# Patient Record
Sex: MALE | Race: BLACK OR AFRICAN AMERICAN | NOT HISPANIC OR LATINO | URBAN - METROPOLITAN AREA
[De-identification: names, ages, dates, MRNs, and addresses within clinical notes are randomized per-mention and may not be internally consistent; named-entity substitution may affect disease eponyms.]

---

## 2022-12-28 ENCOUNTER — OFFICE VISIT (OUTPATIENT)
Dept: INTERNAL MEDICINE | Facility: CLINIC | Age: 22
End: 2022-12-28

## 2022-12-28 VITALS
WEIGHT: 196.4 LBS | HEART RATE: 87 BPM | BODY MASS INDEX: 29.77 KG/M2 | HEIGHT: 68 IN | OXYGEN SATURATION: 98 % | DIASTOLIC BLOOD PRESSURE: 82 MMHG | SYSTOLIC BLOOD PRESSURE: 140 MMHG | TEMPERATURE: 98 F

## 2022-12-28 DIAGNOSIS — R61 HYPERHIDROSIS: Primary | ICD-10-CM

## 2022-12-28 DIAGNOSIS — K58.2 IRRITABLE BOWEL SYNDROME WITH BOTH CONSTIPATION AND DIARRHEA: ICD-10-CM

## 2022-12-28 DIAGNOSIS — G47.33 OSA (OBSTRUCTIVE SLEEP APNEA): ICD-10-CM

## 2022-12-28 PROCEDURE — 99203 OFFICE O/P NEW LOW 30 MIN: CPT | Performed by: NURSE PRACTITIONER

## 2022-12-28 RX ORDER — DICYCLOMINE HYDROCHLORIDE 10 MG/1
10 CAPSULE ORAL
Qty: 120 CAPSULE | Refills: 3 | Status: SHIPPED | OUTPATIENT
Start: 2022-12-28

## 2022-12-28 RX ORDER — GLYCOPYRROLATE 1 MG/1
1 TABLET ORAL 2 TIMES DAILY
Qty: 60 TABLET | Refills: 3 | Status: SHIPPED | OUTPATIENT
Start: 2022-12-28

## 2022-12-28 NOTE — PROGRESS NOTES
Subjective   Christiano Rivas is a 22 y.o. male. Patient is here today for   Chief Complaint   Patient presents with   • Establish Care   • Excessive Sweating          Vitals:    12/28/22 1517   BP: 140/82   Pulse: 87   Temp: 98 °F (36.7 °C)   SpO2: 98%     Body mass index is 29.86 kg/m².  The following portions of the patient's history were reviewed and updated as appropriate: allergies, current medications, past family history, past medical history, past social history, past surgical history and problem list.    Past Medical History:   Diagnosis Date   • Asthma    • Sleep apnea       No Known Allergies   Social History     Socioeconomic History   • Marital status: Single   Tobacco Use   • Smoking status: Never   Vaping Use   • Vaping Use: Never used   Substance and Sexual Activity   • Alcohol use: Never   • Drug use: Never        Current Outpatient Medications:   •  dicyclomine (BENTYL) 10 MG capsule, Take 1 capsule by mouth 4 (Four) Times a Day Before Meals & at Bedtime As Needed (stomach cramping)., Disp: 120 capsule, Rfl: 3  •  glycopyrrolate (Robinul) 1 MG tablet, Take 1 tablet by mouth 2 (Two) Times a Day., Disp: 60 tablet, Rfl: 3     Objective     History of Present Illness  Milind is a 22 year old male patient who is here to establish care. He was referred to our practice by his mom, who is a patient here. It has been several years since he has seen a PCP. He had BRITTANI as a child and had a Tonsillectomy and adenoidectomy. He was on CPAP for a short period. 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 He sometimes gets only a few hours of sleep. He has fatigue and snores. There has been no witnessed apnea  He has IBS with constipation and diarrhea. He gets pain and has to have a bowel movement right after he eats   He also has excessive sweating that has been occurring for years. It is axillary and palmar  He has used topicals which caused his hands  to break out in a rash and crack   He is here with his mom today     Review of Systems   Constitutional: Positive for fatigue.   Respiratory: Negative.    Cardiovascular: Negative.    Gastrointestinal: Positive for abdominal pain (cramping after eating, h/o ibs ), constipation and diarrhea.   Psychiatric/Behavioral: Positive for sleep disturbance. The patient is nervous/anxious (occasionally ).        Physical Exam  Vitals and nursing note reviewed.   Constitutional:       Appearance: Normal appearance.   Cardiovascular:      Rate and Rhythm: Normal rate and regular rhythm.      Heart sounds: Normal heart sounds.   Pulmonary:      Effort: Pulmonary effort is normal.      Breath sounds: Normal breath sounds.   Abdominal:      General: Bowel sounds are normal.   Skin:     General: Skin is moist.      Comments: Diaphoresis of palms, axilla noted    Neurological:      Mental Status: He is alert and oriented to person, place, and time.   Psychiatric:         Mood and Affect: Mood normal.         ASSESSMENT     Problems Addressed this Visit    None  Visit Diagnoses     Hyperhidrosis    -  Primary    Relevant Orders    CBC & Differential    Comprehensive Metabolic Panel    TSH Rfx On Abnormal To Free T4    BRITTANI (obstructive sleep apnea)        Relevant Orders    Ambulatory Referral to Sleep Medicine    Irritable bowel syndrome with both constipation and diarrhea          Diagnoses       Codes Comments    Hyperhidrosis    -  Primary ICD-10-CM: R61  ICD-9-CM: 705.21     BRITTANI (obstructive sleep apnea)     ICD-10-CM: G47.33  ICD-9-CM: 327.23     Irritable bowel syndrome with both constipation and diarrhea     ICD-10-CM: K58.2  ICD-9-CM: 564.1           PLAN  1. Hyperhydrosis, trial of robinul , he can take it 1-2 times daily. Discussed potential side effects and he will let me know if he cannot tolerate it. Can refer to dermatology if no improvement   2. BRITTANI dx in 2014 - has not been on CPAP, will refer to sleep medicine for  repeat sleep study   3. IBS - declined GI referral, recommend IB guard otc three times daily, rx for bentyl given to take as needed   Will check labs today and call with results     Return in about 6 months (around 6/28/2023) for Annual physical.

## 2022-12-29 LAB
ALBUMIN SERPL-MCNC: 4.7 G/DL (ref 3.5–5.2)
ALBUMIN/GLOB SERPL: 2 G/DL
ALP SERPL-CCNC: 88 U/L (ref 39–117)
ALT SERPL-CCNC: 31 U/L (ref 1–41)
AST SERPL-CCNC: 20 U/L (ref 1–40)
BASOPHILS # BLD AUTO: 0.04 10*3/MM3 (ref 0–0.2)
BASOPHILS NFR BLD AUTO: 0.4 % (ref 0–1.5)
BILIRUB SERPL-MCNC: 0.5 MG/DL (ref 0–1.2)
BUN SERPL-MCNC: 17 MG/DL (ref 6–20)
BUN/CREAT SERPL: 14.4 (ref 7–25)
CALCIUM SERPL-MCNC: 10 MG/DL (ref 8.6–10.5)
CHLORIDE SERPL-SCNC: 103 MMOL/L (ref 98–107)
CO2 SERPL-SCNC: 30 MMOL/L (ref 22–29)
CREAT SERPL-MCNC: 1.18 MG/DL (ref 0.76–1.27)
EGFRCR SERPLBLD CKD-EPI 2021: 89.5 ML/MIN/1.73
EOSINOPHIL # BLD AUTO: 0.43 10*3/MM3 (ref 0–0.4)
EOSINOPHIL NFR BLD AUTO: 4 % (ref 0.3–6.2)
ERYTHROCYTE [DISTWIDTH] IN BLOOD BY AUTOMATED COUNT: 13.3 % (ref 12.3–15.4)
GLOBULIN SER CALC-MCNC: 2.3 GM/DL
GLUCOSE SERPL-MCNC: 97 MG/DL (ref 65–99)
HCT VFR BLD AUTO: 47.9 % (ref 37.5–51)
HGB BLD-MCNC: 15.6 G/DL (ref 13–17.7)
IMM GRANULOCYTES # BLD AUTO: 0.03 10*3/MM3 (ref 0–0.05)
IMM GRANULOCYTES NFR BLD AUTO: 0.3 % (ref 0–0.5)
LYMPHOCYTES # BLD AUTO: 4.87 10*3/MM3 (ref 0.7–3.1)
LYMPHOCYTES NFR BLD AUTO: 45.6 % (ref 19.6–45.3)
MCH RBC QN AUTO: 27.1 PG (ref 26.6–33)
MCHC RBC AUTO-ENTMCNC: 32.6 G/DL (ref 31.5–35.7)
MCV RBC AUTO: 83.2 FL (ref 79–97)
MONOCYTES # BLD AUTO: 0.86 10*3/MM3 (ref 0.1–0.9)
MONOCYTES NFR BLD AUTO: 8.1 % (ref 5–12)
NEUTROPHILS # BLD AUTO: 4.44 10*3/MM3 (ref 1.7–7)
NEUTROPHILS NFR BLD AUTO: 41.6 % (ref 42.7–76)
NRBC BLD AUTO-RTO: 0 /100 WBC (ref 0–0.2)
PLATELET # BLD AUTO: 278 10*3/MM3 (ref 140–450)
POTASSIUM SERPL-SCNC: 4.5 MMOL/L (ref 3.5–5.2)
PROT SERPL-MCNC: 7 G/DL (ref 6–8.5)
RBC # BLD AUTO: 5.76 10*6/MM3 (ref 4.14–5.8)
SODIUM SERPL-SCNC: 143 MMOL/L (ref 136–145)
TSH SERPL DL<=0.005 MIU/L-ACNC: 1.05 UIU/ML (ref 0.27–4.2)
WBC # BLD AUTO: 10.67 10*3/MM3 (ref 3.4–10.8)

## 2023-04-26 ENCOUNTER — OFFICE VISIT (OUTPATIENT)
Dept: SLEEP MEDICINE | Facility: HOSPITAL | Age: 23
End: 2023-04-26
Payer: OTHER GOVERNMENT

## 2023-04-26 VITALS
DIASTOLIC BLOOD PRESSURE: 77 MMHG | OXYGEN SATURATION: 100 % | HEIGHT: 68 IN | WEIGHT: 209 LBS | HEART RATE: 66 BPM | BODY MASS INDEX: 31.67 KG/M2 | SYSTOLIC BLOOD PRESSURE: 142 MMHG

## 2023-04-26 DIAGNOSIS — G47.33 OSA (OBSTRUCTIVE SLEEP APNEA): Primary | ICD-10-CM

## 2023-04-26 DIAGNOSIS — G47.26 CIRCADIAN RHYTHM SLEEP DISORDER, SHIFT WORK TYPE: ICD-10-CM

## 2023-04-26 PROCEDURE — G0463 HOSPITAL OUTPT CLINIC VISIT: HCPCS

## 2023-04-26 NOTE — PROGRESS NOTES
"[facility]  Telephone: [phone] / Fax: [phone] [place]  Telephone: [phone] / Fax: [phone] [place]    Referring Physician: Jyoti Aragon APRN  PCP: Jyoti Aragon APRN    Reason for consult:  sleep apnea    Akua Rivas is a 22 y.o.male  was seen in the Sleep Disorders Center today for evaluation of sleep apnea. Pt is here today for evaluation of very disturbing snoring. Snoring is associated with gasping for breath and apneas. 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 Shift starts at 8:30pm and ends at 5am.  He goes to bed at 7:30am. However he does not get 7-8 hour of sleep.  He feels tired/sleepy during the day. He continues to report sleepiness even if he sleeps longer. He reports difficulty concentrating, poor memory, anxiety and depression.    SH- no tobacco, 1 caffeine    ROS-+nasal congestion, +myalgias, +chest pain, +dizziness, +anxiety, +GERD.      Akua Rivas  has a past medical history of Asthma and Sleep apnea.    Current Medications:    Current Outpatient Medications:   •  dicyclomine (BENTYL) 10 MG capsule, Take 1 capsule by mouth 4 (Four) Times a Day Before Meals & at Bedtime As Needed (stomach cramping)., Disp: 120 capsule, Rfl: 3  •  glycopyrrolate (Robinul) 1 MG tablet, Take 1 tablet by mouth 2 (Two) Times a Day., Disp: 60 tablet, Rfl: 3    I have reviewed Past Medical History, Past Surgical History, Medication List, Social History and Family History as entered in Sleep Questionnaire and EPIC.    ESS  6   Vital Signs /77   Pulse 66   Ht 172.7 cm (68\")   Wt 94.8 kg (209 lb)   SpO2 100%   BMI 31.78 kg/m²  Body mass index is 31.78 kg/m².    General Alert and oriented. No acute distress noted   Pharynx/Throat Class IV  Mallampati airway, large tongue, no evidence of redundant lateral pharyngeal tissue. No oral lesions. No thrush. Moist mucous membranes.   Head Normocephalic. Symmetrical. Atraumatic.    Nose No " septal deviation. No drainage   Chest Wall Normal shape. Symmetric expansion with respiration. No tenderness.   Neck Trachea midline, no thyromegaly or adenopathy    Lungs Clear to auscultation bilaterally. No wheezes. No rhonchi. No rales. Respirations regular, even and unlabored.   Heart Regular rhythm and normal rate. Normal S1 and S2. No murmur   Abdomen Soft, non-tender and non-distended. Normal bowel sounds. No masses.   Extremities Moves all extremities well. No edema   Psychiatric Normal mood and affect.        Impression:  1. BRITTANI (obstructive sleep apnea)    2. Circadian rhythm sleep disorder, shift work type          Plan:  I discussed the pathophysiology of obstructive sleep apnea with the patient.  We discussed the adverse outcomes associated with untreated sleep-disordered breathing.   Sleep study will be scheduled to establish a definitive diagnosis of sleep disorder breathing.  Weight loss will be strongly beneficial in order to reduce the severity of sleep-disordered breathing.  Patient has narrow oropharyngeal structure.  Caution during activities that require prolonged concentration is strongly advised.  After sleep study results are available, patient will be notified, and appointment will be scheduled to discuss sleep study results and treatment recommendations.    HST was ordered.    I appreciate the opportunity to participate in this patient's care.      PIPPA Ohara  [facility]  Phone: [phone]      Part of this note may be an electronic transcription/translation of spoken language to printed text using the Dragon Dictation System. Some errors may exist even though the document was edited.

## 2023-06-15 DIAGNOSIS — Z11.59 NEED FOR HEPATITIS C SCREENING TEST: ICD-10-CM

## 2023-06-15 DIAGNOSIS — Z00.00 ROUTINE HEALTH MAINTENANCE: Primary | ICD-10-CM

## 2023-06-21 LAB — CK SERPL-CCNC: 6004 U/L (ref 20–200)

## 2023-06-22 LAB
ALBUMIN SERPL-MCNC: 4.6 G/DL (ref 3.5–5.2)
ALBUMIN/GLOB SERPL: 1.9 G/DL
ALP SERPL-CCNC: 87 U/L (ref 39–117)
ALT SERPL-CCNC: 284 U/L (ref 1–41)
APPEARANCE UR: CLEAR
AST SERPL-CCNC: 174 U/L (ref 1–40)
BACTERIA #/AREA URNS HPF: NORMAL /HPF
BASOPHILS # BLD AUTO: 0.03 10*3/MM3 (ref 0–0.2)
BASOPHILS NFR BLD AUTO: 0.3 % (ref 0–1.5)
BILIRUB SERPL-MCNC: 0.4 MG/DL (ref 0–1.2)
BILIRUB UR QL STRIP: NEGATIVE
BUN SERPL-MCNC: 14 MG/DL (ref 6–20)
BUN/CREAT SERPL: 12.6 (ref 7–25)
CALCIUM SERPL-MCNC: 10.5 MG/DL (ref 8.6–10.5)
CASTS URNS MICRO: NORMAL
CHLORIDE SERPL-SCNC: 100 MMOL/L (ref 98–107)
CHOLEST SERPL-MCNC: 168 MG/DL (ref 0–200)
CO2 SERPL-SCNC: 28.1 MMOL/L (ref 22–29)
COLOR UR: YELLOW
CREAT SERPL-MCNC: 1.11 MG/DL (ref 0.76–1.27)
EGFRCR SERPLBLD CKD-EPI 2021: 96.3 ML/MIN/1.73
EOSINOPHIL # BLD AUTO: 0.66 10*3/MM3 (ref 0–0.4)
EOSINOPHIL NFR BLD AUTO: 6.7 % (ref 0.3–6.2)
EPI CELLS #/AREA URNS HPF: NORMAL /HPF
ERYTHROCYTE [DISTWIDTH] IN BLOOD BY AUTOMATED COUNT: 13.7 % (ref 12.3–15.4)
GLOBULIN SER CALC-MCNC: 2.4 GM/DL
GLUCOSE SERPL-MCNC: 100 MG/DL (ref 65–99)
GLUCOSE UR QL STRIP: NEGATIVE
HCT VFR BLD AUTO: 50 % (ref 37.5–51)
HCV IGG SERPL QL IA: NON REACTIVE
HDLC SERPL-MCNC: 41 MG/DL (ref 40–60)
HGB BLD-MCNC: 16.2 G/DL (ref 13–17.7)
HGB UR QL STRIP: NEGATIVE
IMM GRANULOCYTES # BLD AUTO: 0.03 10*3/MM3 (ref 0–0.05)
IMM GRANULOCYTES NFR BLD AUTO: 0.3 % (ref 0–0.5)
KETONES UR QL STRIP: NEGATIVE
LDLC SERPL CALC-MCNC: 114 MG/DL (ref 0–100)
LDLC/HDLC SERPL: 2.77 {RATIO}
LEUKOCYTE ESTERASE UR QL STRIP: NEGATIVE
LYMPHOCYTES # BLD AUTO: 4.4 10*3/MM3 (ref 0.7–3.1)
LYMPHOCYTES NFR BLD AUTO: 44.7 % (ref 19.6–45.3)
MCH RBC QN AUTO: 27 PG (ref 26.6–33)
MCHC RBC AUTO-ENTMCNC: 32.4 G/DL (ref 31.5–35.7)
MCV RBC AUTO: 83.3 FL (ref 79–97)
MONOCYTES # BLD AUTO: 0.73 10*3/MM3 (ref 0.1–0.9)
MONOCYTES NFR BLD AUTO: 7.4 % (ref 5–12)
NEUTROPHILS # BLD AUTO: 3.99 10*3/MM3 (ref 1.7–7)
NEUTROPHILS NFR BLD AUTO: 40.6 % (ref 42.7–76)
NITRITE UR QL STRIP: NEGATIVE
NRBC BLD AUTO-RTO: 0 /100 WBC (ref 0–0.2)
PH UR STRIP: 6 [PH] (ref 5–8)
PLATELET # BLD AUTO: 280 10*3/MM3 (ref 140–450)
POTASSIUM SERPL-SCNC: 4.9 MMOL/L (ref 3.5–5.2)
PROT SERPL-MCNC: 7 G/DL (ref 6–8.5)
PROT UR QL STRIP: NEGATIVE
RBC # BLD AUTO: 6 10*6/MM3 (ref 4.14–5.8)
RBC #/AREA URNS HPF: NORMAL /HPF
SODIUM SERPL-SCNC: 138 MMOL/L (ref 136–145)
SP GR UR STRIP: 1.02 (ref 1–1.03)
TRIGL SERPL-MCNC: 68 MG/DL (ref 0–150)
TSH SERPL DL<=0.005 MIU/L-ACNC: 0.59 UIU/ML (ref 0.27–4.2)
UROBILINOGEN UR STRIP-MCNC: NORMAL MG/DL
VLDLC SERPL CALC-MCNC: 13 MG/DL (ref 5–40)
WBC # BLD AUTO: 9.84 10*3/MM3 (ref 3.4–10.8)
WBC #/AREA URNS HPF: NORMAL /HPF

## 2023-08-14 ENCOUNTER — TELEPHONE (OUTPATIENT)
Dept: INTERNAL MEDICINE | Facility: CLINIC | Age: 23
End: 2023-08-14
Payer: OTHER GOVERNMENT

## 2023-08-14 ENCOUNTER — OFFICE VISIT (OUTPATIENT)
Dept: INTERNAL MEDICINE | Facility: CLINIC | Age: 23
End: 2023-08-14
Payer: OTHER GOVERNMENT

## 2023-08-14 VITALS
BODY MASS INDEX: 32.13 KG/M2 | RESPIRATION RATE: 18 BRPM | OXYGEN SATURATION: 97 % | HEIGHT: 68 IN | SYSTOLIC BLOOD PRESSURE: 118 MMHG | WEIGHT: 212 LBS | DIASTOLIC BLOOD PRESSURE: 80 MMHG | HEART RATE: 71 BPM

## 2023-08-14 DIAGNOSIS — S61.210A LACERATION OF RIGHT INDEX FINGER WITHOUT FOREIGN BODY, NAIL DAMAGE STATUS UNSPECIFIED, INITIAL ENCOUNTER: Primary | ICD-10-CM

## 2023-08-14 PROCEDURE — 99213 OFFICE O/P EST LOW 20 MIN: CPT | Performed by: NURSE PRACTITIONER

## 2023-08-14 NOTE — TELEPHONE ENCOUNTER
SPOKE WITH PATIENT, [facility] THAT HE WENT TO WON'T GIVE HIM NOTE, THEY ADVISED TO CONTACT PCP.  D[facility] HAD NO OPENINGS BUT DR KAUR HAD SAME DAY.  SCHEDULED TO COME IN AT 12:45.

## 2023-08-14 NOTE — TELEPHONE ENCOUNTER
Caller: Akua Rivas    Relationship: Self    Best call back number: [phone]     What is the best time to reach you: ANY    Who are you requesting to speak with (clinical staff, provider,  specific staff member): CLINICAL STAFF    Do you know the name of the person who called:      What was the call regarding: PATIENT CALLED HE WAS WASHING DISHES CUT HIS RIGHT INDEX FINGER AROUND THE KNUCKLE AREA AND WENT TO [facility] 8/13/23 SUNDAY.  HE HAS 3 STITCHES WITH SPLINT AND WANTS TO KNOW IF HE NEEDS TO BE ON LIGHT DUTY AT WORK.  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  WILL NEED NOTE IF HE IS SUPPOSE ON LIGHT DUTY.      Is it okay if the provider responds through [facility]: YES

## 2023-08-14 NOTE — PROGRESS NOTES
Subjective   Akua Rivas is a 22 y.o. male.   Chief Complaint   Patient presents with    Cut on Finger    Laceration     Follow up urgent care      Vitals:    08/14/23 1244   BP: 118/80   Pulse: 71   Resp: 18   SpO2: 97%     No LMP for male patient.    History of Present Illness  Akua is a 22 year old male patient who is here for a follow up from Urgent care . He was cut his right index finger with a kitchen knife. He was seen at [facility] urgent care yesterday and had 3 sutures placed. Tdap was also updated.     The following portions of the patient's history were reviewed and updated as appropriate: allergies, current medications, past family history, past medical history, past social history, past surgical history, and problem list.    Review of Systems   Constitutional:  Negative for fever.   Skin:         Laceration      Objective   Physical Exam  Vitals and nursing note reviewed.   Pulmonary:      Effort: Pulmonary effort is normal.   Skin:     Findings: Laceration (to right index finger. dressing removed, sutures are intact, no drainage or swelling noted) present.   Neurological:      Mental Status: He is alert.       Assessment & Plan   Diagnoses and all orders for this visit:    1. Laceration of right index finger without foreign body, nail damage status unspecified, initial encounter (Primary)      Suture removal per [facility] recommendations. He can schedule an appt to return here for removal or can have them removed where they were placed  Keep the area clean and dry  Keep covered when at work.   Work note given for tonight, can return to work Thursday. If he needs more time off work or more accommodations he will let me know.

## 2024-07-03 DIAGNOSIS — Z00.00 ROUTINE HEALTH MAINTENANCE: Primary | ICD-10-CM

## 2024-07-13 LAB
ALBUMIN SERPL-MCNC: 4.7 G/DL (ref 3.5–5.2)
ALBUMIN/GLOB SERPL: 2.1 G/DL
ALP SERPL-CCNC: 101 U/L (ref 39–117)
ALT SERPL-CCNC: 45 U/L (ref 1–41)
AST SERPL-CCNC: 22 U/L (ref 1–40)
BASOPHILS # BLD MANUAL: 0 10*3/MM3 (ref 0–0.2)
BASOPHILS NFR BLD MANUAL: 0 % (ref 0–1.5)
BILIRUB SERPL-MCNC: 0.6 MG/DL (ref 0–1.2)
BUN SERPL-MCNC: 12 MG/DL (ref 6–20)
BUN/CREAT SERPL: 10.9 (ref 7–25)
CALCIUM SERPL-MCNC: 9.6 MG/DL (ref 8.6–10.5)
CHLORIDE SERPL-SCNC: 104 MMOL/L (ref 98–107)
CHOLEST SERPL-MCNC: 176 MG/DL (ref 0–200)
CO2 SERPL-SCNC: 25.8 MMOL/L (ref 22–29)
CREAT SERPL-MCNC: 1.1 MG/DL (ref 0.76–1.27)
DIFFERENTIAL COMMENT: ABNORMAL
EGFRCR SERPLBLD CKD-EPI 2021: 96.7 ML/MIN/1.73
EOSINOPHIL # BLD MANUAL: 0 10*3/MM3 (ref 0–0.4)
EOSINOPHIL NFR BLD MANUAL: 0 % (ref 0.3–6.2)
ERYTHROCYTE [DISTWIDTH] IN BLOOD BY AUTOMATED COUNT: 13.5 % (ref 12.3–15.4)
GLOBULIN SER CALC-MCNC: 2.2 GM/DL
GLUCOSE SERPL-MCNC: 109 MG/DL (ref 65–99)
HCT VFR BLD AUTO: 48.2 % (ref 37.5–51)
HDLC SERPL-MCNC: 39 MG/DL (ref 40–60)
HGB BLD-MCNC: 15.5 G/DL (ref 13–17.7)
LDLC SERPL CALC-MCNC: 120 MG/DL (ref 0–100)
LDLC/HDLC SERPL: 3.04 {RATIO}
LYMPHOCYTES # BLD MANUAL: 0 10*3/MM3 (ref 0.7–3.1)
LYMPHOCYTES NFR BLD MANUAL: 0 % (ref 19.6–45.3)
MCH RBC QN AUTO: 27.1 PG (ref 26.6–33)
MCHC RBC AUTO-ENTMCNC: 32.2 G/DL (ref 31.5–35.7)
MCV RBC AUTO: 84.4 FL (ref 79–97)
MONOCYTES # BLD MANUAL: 0 10*3/MM3 (ref 0.1–0.9)
MONOCYTES NFR BLD MANUAL: 0 % (ref 5–12)
NEUTROPHILS # BLD MANUAL: 0 10*3/MM3 (ref 1.7–7)
NEUTROPHILS NFR BLD MANUAL: 0 % (ref 42.7–76)
PLATELET # BLD AUTO: 304 10*3/MM3 (ref 140–450)
PLATELET BLD QL SMEAR: ABNORMAL
POTASSIUM SERPL-SCNC: 4.4 MMOL/L (ref 3.5–5.2)
PROT SERPL-MCNC: 6.9 G/DL (ref 6–8.5)
RBC # BLD AUTO: 5.71 10*6/MM3 (ref 4.14–5.8)
RBC MORPH BLD: ABNORMAL
SODIUM SERPL-SCNC: 142 MMOL/L (ref 136–145)
TRIGL SERPL-MCNC: 92 MG/DL (ref 0–150)
TSH SERPL DL<=0.005 MIU/L-ACNC: 0.47 UIU/ML (ref 0.27–4.2)
VLDLC SERPL CALC-MCNC: 17 MG/DL (ref 5–40)
WBC # BLD AUTO: 11.39 10*3/MM3 (ref 3.4–10.8)

## 2024-07-15 ENCOUNTER — TELEPHONE (OUTPATIENT)
Dept: INTERNAL MEDICINE | Facility: CLINIC | Age: 24
End: 2024-07-15
Payer: OTHER GOVERNMENT

## 2024-07-15 NOTE — TELEPHONE ENCOUNTER
Ok for HUB to read and schedule:    Sent [employer] message for patient to call office and schedule an office visit with Jyoti Aragon to go over lab results

## 2024-07-15 NOTE — TELEPHONE ENCOUNTER
----- Message from Mike GARCIA sent at 7/15/2024 10:41 AM EDT -----  Pt had labs drawn but does not have a follow up scheduled. Please schedule patient appt  ----- Message -----  From: Jyoti Aragon APRN  Sent: 7/15/2024   8:34 AM EDT  To: Mike Tomas MA    Pt had labs drawn but does not have a follow up scheduled. Please schedule patient appt   EMT/paramedic

## 2024-07-16 ENCOUNTER — TELEPHONE (OUTPATIENT)
Dept: INTERNAL MEDICINE | Facility: CLINIC | Age: 24
End: 2024-07-16
Payer: OTHER GOVERNMENT

## 2024-07-16 NOTE — TELEPHONE ENCOUNTER
Reviewed labs,   Pt had physical labs and no appt to follow up. Please call and schedule pt a follow up appt

## 2024-07-22 ENCOUNTER — OFFICE VISIT (OUTPATIENT)
Dept: INTERNAL MEDICINE | Facility: CLINIC | Age: 24
End: 2024-07-22
Payer: OTHER GOVERNMENT

## 2024-07-22 VITALS
HEART RATE: 80 BPM | DIASTOLIC BLOOD PRESSURE: 80 MMHG | RESPIRATION RATE: 16 BRPM | HEIGHT: 68 IN | WEIGHT: 223 LBS | BODY MASS INDEX: 33.8 KG/M2 | OXYGEN SATURATION: 98 % | SYSTOLIC BLOOD PRESSURE: 144 MMHG

## 2024-07-22 DIAGNOSIS — Z00.00 ANNUAL PHYSICAL EXAM: Primary | ICD-10-CM

## 2024-07-22 DIAGNOSIS — Z91.09 ENVIRONMENTAL ALLERGIES: ICD-10-CM

## 2024-07-22 DIAGNOSIS — D72.829 LEUKOCYTOSIS, UNSPECIFIED TYPE: ICD-10-CM

## 2024-07-22 PROCEDURE — 99395 PREV VISIT EST AGE 18-39: CPT | Performed by: NURSE PRACTITIONER

## 2024-07-22 PROCEDURE — 99213 OFFICE O/P EST LOW 20 MIN: CPT | Performed by: NURSE PRACTITIONER

## 2024-07-22 RX ORDER — DICYCLOMINE HYDROCHLORIDE 10 MG/1
10 CAPSULE ORAL
Qty: 120 CAPSULE | Refills: 3 | Status: SHIPPED | OUTPATIENT
Start: 2024-07-22

## 2024-07-22 NOTE — PROGRESS NOTES
Subjective   Akua Rivas is a 23 y.o. male. Patient is here today for   Chief Complaint   Patient presents with    Annual Exam          Vitals:    07/22/24 1401   BP: 144/80   Pulse: 80   Resp: 16   SpO2: 98%     Body mass index is 33.91 kg/m².    The following portions of the patient's history were reviewed and updated as appropriate: allergies, current medications, past family history, past medical history, past social history, past surgical history and problem list.    Past Medical History:   Diagnosis Date    Asthma     Sleep apnea       No Known Allergies   Social History     Socioeconomic History    Marital status: Single   Tobacco Use    Smoking status: Never    Smokeless tobacco: Never   Vaping Use    Vaping status: Never Used   Substance and Sexual Activity    Alcohol use: Never    Drug use: Never        Current Outpatient Medications:     dicyclomine (BENTYL) 10 MG capsule, Take 1 capsule by mouth 4 (Four) Times a Day Before Meals & at Bedtime As Needed (stomach cramping)., Disp: 120 capsule, Rfl: 3       Objective   History of Present Illness   Akua Rivas 23 y.o. male who presents for an Annual physical exam.   Lab results discussed in detail with the patient.  Plan to update vaccines if needed today. He is here with his mother today .     Health Habits:  Dental Exam. not up to date - .  Eye Exam. up to date  Exercise: 0 times/week.  Current exercise activities include: none      Preventative counseling  Discussed face to face the importance of healthy diet and exercise.     Lab Results (most recent)       Orders Only on 07/03/2024   Component Date Value Ref Range Status    Total Cholesterol 07/12/2024 176  0 - 200 mg/dL Final    Comment: Cholesterol Reference Ranges  (U.S. Department of Health and Human Services ATP III  Classifications)  Desirable          <200 mg/dL  Borderline High    200-239 mg/dL  High Risk          >240 mg/dL  Triglyceride Reference Ranges  (U.S. Department of Health and Human  Services ATP III  Classifications)  Normal           <150 mg/dL  Borderline High  150-199 mg/dL  High             200-499 mg/dL  Very High        >500 mg/dL  HDL Reference Ranges  (U.S. Department of Health and Human Services ATP III  Classifications)  Low     <40 mg/dl (major risk factor for CHD)  High    >60 mg/dl ('negative' risk factor for CHD)  LDL Reference Ranges  (U.S. Department of Health and Human Services ATP III  Classifications)  Optimal          <100 mg/dL  Near Optimal     100-129 mg/dL  Borderline High  130-159 mg/dL  High             160-189 mg/dL  Very High        >189 mg/dL      Triglycerides 07/12/2024 92  0 - 150 mg/dL Final    HDL Cholesterol 07/12/2024 39 (L)  40 - 60 mg/dL Final    VLDL Cholesterol Fermin 07/12/2024 17  5 - 40 mg/dL Final    LDL Chol Calc (NIH) 07/12/2024 120 (H)  0 - 100 mg/dL Final    LDL/HDL RATIO 07/12/2024 3.04   Final    Glucose 07/12/2024 109 (H)  65 - 99 mg/dL Final    BUN 07/12/2024 12  6 - 20 mg/dL Final    Creatinine 07/12/2024 1.10  0.76 - 1.27 mg/dL Final    EGFR Result 07/12/2024 96.7  >60.0 mL/min/1.73 Final    Comment: GFR Normal >60  Chronic Kidney Disease <60  Kidney Failure <15      BUN/Creatinine Ratio 07/12/2024 10.9  7.0 - 25.0 Final    Sodium 07/12/2024 142  136 - 145 mmol/L Final    Potassium 07/12/2024 4.4  3.5 - 5.2 mmol/L Final    Chloride 07/12/2024 104  98 - 107 mmol/L Final    Total CO2 07/12/2024 25.8  22.0 - 29.0 mmol/L Final    Calcium 07/12/2024 9.6  8.6 - 10.5 mg/dL Final    Total Protein 07/12/2024 6.9  6.0 - 8.5 g/dL Final    Albumin 07/12/2024 4.7  3.5 - 5.2 g/dL Final    Globulin 07/12/2024 2.2  gm/dL Final    A/G Ratio 07/12/2024 2.1  g/dL Final    Total Bilirubin 07/12/2024 0.6  0.0 - 1.2 mg/dL Final    Alkaline Phosphatase 07/12/2024 101  39 - 117 U/L Final    AST (SGOT) 07/12/2024 22  1 - 40 U/L Final    ALT (SGPT) 07/12/2024 45 (H)  1 - 41 U/L Final    TSH 07/12/2024 0.468  0.270 - 4.200 uIU/mL Final    WBC 07/12/2024 11.39 (H)  3.40 -  10.80 10*3/mm3 Final    RBC 07/12/2024 5.71  4.14 - 5.80 10*6/mm3 Final    Hemoglobin 07/12/2024 15.5  13.0 - 17.7 g/dL Final    Hematocrit 07/12/2024 48.2  37.5 - 51.0 % Final    MCV 07/12/2024 84.4  79.0 - 97.0 fL Final    MCH 07/12/2024 27.1  26.6 - 33.0 pg Final    MCHC 07/12/2024 32.2  31.5 - 35.7 g/dL Final    RDW 07/12/2024 13.5  12.3 - 15.4 % Final    Platelets 07/12/2024 304  140 - 450 10*3/mm3 Final    Neutrophil Rel % 07/12/2024 0.0 (L)  42.7 - 76.0 % Corrected    Comment: Result removed.  Corrected result. Previous result was 20.0 % on 7/12/2024  at 1646 EDT.  The previously reported component Eosinophils % is no  longer being reported. Previous result was 10.5 %  (Reference Range: 0.3-6.2 %) on 7/12/2024 at 1646 EDT.  The previously reported component Absolute Eosinophils is  no longer being reported. Previous result was 1.20 10*3/mm3  (Reference Range: 0.00-0.40 10*3/mm3) on 7/12/2024 at 1646  EDT.      Lymphocyte Rel % 07/12/2024 0.0 (L)  19.6 - 45.3 % Corrected    Comment: Result removed.  Corrected result. Previous result was 65.3 % on 7/12/2024  at 1646 EDT.      Monocyte Rel % 07/12/2024 0.0 (L)  5.0 - 12.0 % Corrected    Comment: Result removed.  Corrected result. Previous result was 4.2 % on 7/12/2024 at  1646 EDT.      Eosinophil Rel % 07/12/2024 0.0 (L)  0.3 - 6.2 % Corrected    Result removed.    Basophil Rel % 07/12/2024 0.0  0.0 - 1.5 % Final    Neutrophils Absolute 07/12/2024 0.00 (L)  1.70 - 7.00 10*3/mm3 Corrected    Comment: Result removed.  Corrected result. Previous result was 2.28 10*3/mm3 on  7/12/2024 at 1646 EDT.      Lymphocytes Absolute 07/12/2024 0.00 (L)  0.70 - 3.10 10*3/mm3 Corrected    Comment: Result removed.  Corrected result. Previous result was 7.44 10*3/mm3 on  7/12/2024 at 1646 EDT.      Monocytes Absolute 07/12/2024 0.00 (L)  0.10 - 0.90 10*3/mm3 Corrected    Comment: Result removed.  Corrected result. Previous result was 0.48 10*3/mm3 on  7/12/2024 at 1646 EDT.       Eosinophil Abs 07/12/2024 0.00  0.00 - 0.40 10*3/mm3 Corrected    Result removed.    Basophils Absolute 07/12/2024 0.00  0.00 - 0.20 10*3/mm3 Final    Differential Comment 07/12/2024 Comment   Corrected    Comment: WBC MORPHOLOGY               Normal                      Normal     N  Result removed.  Appended report. These results have been appended to a  previously final verified report.  SMUDGE CELLS                 TNP                                    N  Corrected result. Previous result was Slight/1+ on  7/12/2024 at 1646 EDT.      Comment 07/12/2024 Comment   Corrected    Comment: RBC MORPHOLOGY               Normal                      Normal     N  Result removed.  Appended report. These results have been appended to a  previously final verified report.  ACANTHOCYTES                 TNP                                    N  Corrected result. Previous result was Slight/1+ on  7/12/2024 at 1646 EDT.  POIKILOCYTOSIS               TNP                                    N  Corrected result. Previous result was Mod/2+ on 7/12/2024  at 1646 EDT.  POLYCHROMASIA                TNP                                    N  Corrected result. Previous result was Slight/1+ on  7/12/2024 at 1646 EDT.  SCHISTOCYTES                 TNP                                    N  Corrected result. Previous result was Slight/1+ on  7/12/2024 at 1646 EDT.      Plt Comment 07/12/2024 Comment   Final    Comment: PLATELET MORPHOLOGY          TNP                                    N  Result removed.  Corrected result. Previous result was Normal on 7/12/2024  at 1646 EDT.                     Review of Systems   Constitutional:  Negative for fatigue.   Respiratory:  Negative for chest tightness.    Gastrointestinal:  Positive for constipation and diarrhea.        IBS    Genitourinary: Negative.    Musculoskeletal: Negative.    Allergic/Immunologic: Positive for environmental allergies and food allergies.   Neurological:  Positive for  dizziness.   Hematological:  Negative for adenopathy. Does not bruise/bleed easily.   Psychiatric/Behavioral:  Negative for dysphoric mood. The patient is nervous/anxious.        Physical Exam  Vitals and nursing note reviewed.   Constitutional:       General: He is not in acute distress.     Appearance: Normal appearance. He is well-developed and well-groomed.   HENT:      Head: Normocephalic.      Ears:      Comments: Cerumen in canal bilaterally, not obscuring TM      Nose: Nose normal.      Mouth/Throat:      Pharynx: Oropharynx is clear.   Eyes:      General: Lids are normal.      Conjunctiva/sclera: Conjunctivae normal.   Neck:      Thyroid: No thyromegaly.   Cardiovascular:      Rate and Rhythm: Normal rate and regular rhythm.      Heart sounds: Normal heart sounds.   Pulmonary:      Effort: Pulmonary effort is normal.   Abdominal:      General: Bowel sounds are normal.      Palpations: Abdomen is soft.   Musculoskeletal:      Cervical back: Neck supple.   Skin:     General: Skin is warm and dry.   Neurological:      Mental Status: He is alert and oriented to person, place, and time.   Psychiatric:         Mood and Affect: Mood normal.         ASSESSMENT       Problems Addressed this Visit    None  Visit Diagnoses       Annual physical exam    -  Primary    Relevant Orders    C-reactive protein    Leukocytosis, unspecified type        Relevant Orders    Peripheral Blood Smear    CBC & Differential    Sedimentation Rate    C-reactive protein    Environmental allergies        Relevant Orders    Ambulatory Referral to Allergy          Diagnoses         Codes Comments    Annual physical exam    -  Primary ICD-10-CM: Z00.00  ICD-9-CM: V70.0     Leukocytosis, unspecified type     ICD-10-CM: D72.829  ICD-9-CM: 288.60     Environmental allergies     ICD-10-CM: Z91.09  ICD-9-CM: V15.09             PLAN    Wbc is elevated at 11.23. it was also elevated last year when checked but was spike on recheck . Will repeat cbc  today and include peripheral smear and esr, CRP , dicussed referral to hematology if wbc still elevated   His mom requests referral to allergy for allergy testing   Discussed medication for anxiety but patient prefers counseling- list of counselors given to patient   Age and sex appropriate physical exam performed and documented. Updated past medical, family, social and surgical histories as well as allergies and care team list. Addressed care gaps listed in the medical record.   - seat belt use for every car ride for patient and all occupants.    sunscreen use to reduce risk of skin cancer for any days with sun exposure over 20 minutes.   -Encouraged annual dental and vision exams as part of their overall health.   -Encouraged  exercise  combined with a well-balanced diet.   -Immunizations reviewed and updated in EMR.       Return in about 1 year (around 7/22/2025) for Annual physical, with labs.  Patient was given instructions and counseling regarding his  condition for health maintenance advice.  Please see specific information pulled into the AVS if appropriate.

## 2024-07-23 LAB
CRP SERPL-MCNC: 2 MG/L (ref 0–10)
ERYTHROCYTE [SEDIMENTATION RATE] IN BLOOD BY WESTERGREN METHOD: 2 MM/HR (ref 0–15)

## 2024-07-24 LAB
BASOPHILS # BLD AUTO: 0 X10E3/UL (ref 0–0.2)
BASOPHILS NFR BLD AUTO: 0 %
EOSINOPHIL # BLD AUTO: 0.4 X10E3/UL (ref 0–0.4)
EOSINOPHIL NFR BLD AUTO: 4 %
ERYTHROCYTE [DISTWIDTH] IN BLOOD BY AUTOMATED COUNT: 13.4 % (ref 11.6–15.4)
HCT VFR BLD AUTO: 48.2 % (ref 37.5–51)
HGB BLD-MCNC: 15.5 G/DL (ref 13–17.7)
IMM GRANULOCYTES # BLD AUTO: 0 X10E3/UL (ref 0–0.1)
IMM GRANULOCYTES NFR BLD AUTO: 0 %
LYMPHOCYTES # BLD AUTO: 4.3 X10E3/UL (ref 0.7–3.1)
LYMPHOCYTES NFR BLD AUTO: 51 %
MCH RBC QN AUTO: 27.3 PG (ref 26.6–33)
MCHC RBC AUTO-ENTMCNC: 32.2 G/DL (ref 31.5–35.7)
MCV RBC AUTO: 85 FL (ref 79–97)
MONOCYTES # BLD AUTO: 0.7 X10E3/UL (ref 0.1–0.9)
MONOCYTES NFR BLD AUTO: 9 %
NEUTROPHILS # BLD AUTO: 3 X10E3/UL (ref 1.4–7)
NEUTROPHILS NFR BLD AUTO: 36 %
PATH INTERP BLD-IMP: ABNORMAL
PATH REV BLD -IMP: ABNORMAL
PATHOLOGIST NAME: ABNORMAL
PLATELET # BLD AUTO: 275 X10E3/UL (ref 150–450)
RBC # BLD AUTO: 5.67 X10E6/UL (ref 4.14–5.8)
WBC # BLD AUTO: 8.3 X10E3/UL (ref 3.4–10.8)

## 2024-08-16 ENCOUNTER — OFFICE VISIT (OUTPATIENT)
Dept: INTERNAL MEDICINE | Facility: CLINIC | Age: 24
End: 2024-08-16
Payer: OTHER GOVERNMENT

## 2024-08-16 VITALS
HEIGHT: 68 IN | HEART RATE: 76 BPM | BODY MASS INDEX: 34.1 KG/M2 | OXYGEN SATURATION: 98 % | DIASTOLIC BLOOD PRESSURE: 74 MMHG | RESPIRATION RATE: 18 BRPM | SYSTOLIC BLOOD PRESSURE: 128 MMHG | WEIGHT: 225 LBS

## 2024-08-16 DIAGNOSIS — R30.0 DYSURIA: Primary | ICD-10-CM

## 2024-08-16 LAB
BILIRUB BLD-MCNC: NEGATIVE MG/DL
CLARITY, POC: CLEAR
COLOR UR: YELLOW
EXPIRATION DATE: ABNORMAL
GLUCOSE UR STRIP-MCNC: NEGATIVE MG/DL
KETONES UR QL: NEGATIVE
LEUKOCYTE EST, POC: NEGATIVE
Lab: ABNORMAL
NITRITE UR-MCNC: NEGATIVE MG/ML
PH UR: 6 [PH] (ref 5–8)
PROT UR STRIP-MCNC: NEGATIVE MG/DL
RBC # UR STRIP: NEGATIVE /UL
SP GR UR: 1.04 (ref 1–1.03)
UROBILINOGEN UR QL: ABNORMAL

## 2024-08-16 PROCEDURE — 99213 OFFICE O/P EST LOW 20 MIN: CPT | Performed by: NURSE PRACTITIONER

## 2024-08-16 PROCEDURE — 81003 URINALYSIS AUTO W/O SCOPE: CPT | Performed by: NURSE PRACTITIONER

## 2024-08-16 NOTE — PROGRESS NOTES
Subjective   Akua Rivas is a 23 y.o. male. Patient is here today for   Chief Complaint   Patient presents with    Difficulty Urinating          Vitals:    08/16/24 1244   BP: 128/74   Pulse: 76   Resp: 18   SpO2: 98%     Body mass index is 34.21 kg/m².  The following portions of the patient's history were reviewed and updated as appropriate: allergies, current medications, past family history, past medical history, past social history, past surgical history and problem list.    Past Medical History:   Diagnosis Date    Asthma     Sleep apnea       No Known Allergies   Social History     Socioeconomic History    Marital status: Single   Tobacco Use    Smoking status: Never    Smokeless tobacco: Never   Vaping Use    Vaping status: Never Used   Substance and Sexual Activity    Alcohol use: Never    Drug use: Never        Current Outpatient Medications:     dicyclomine (BENTYL) 10 MG capsule, Take 1 capsule by mouth 4 (Four) Times a Day Before Meals & at Bedtime As Needed (stomach cramping)., Disp: 120 capsule, Rfl: 3     Objective     History of Present Illness  Akua is a 23 year old male patient who is here for an acute visit. He c/o difficulty urinating and feels like he is not emptying his bladder correctly. He is concerned for UTI   Difficulty Urinating   This is a new problem. The current episode started yesterday. The pain is at a severity of 0/10. The patient is experiencing no pain. There has been no fever. He is sexually active (same partner, monogamous, denies risk for STD). There is no history of pyelonephritis. Pertinent negatives include no chills, discharge, flank pain, frequency, hematuria, hesitancy, nausea, sweats, urgency or vomiting. There is no history of kidney stones, recurrent UTIs or a urological procedure.        Review of Systems   Constitutional:  Negative for chills.   Respiratory: Negative.     Cardiovascular: Negative.    Gastrointestinal:  Negative for nausea and vomiting.    Genitourinary:  Positive for decreased urine volume and difficulty urinating. Negative for dysuria, flank pain, frequency, hematuria, hesitancy, penile discharge, penile pain, scrotal swelling, testicular pain and urgency.       Physical Exam  Vitals and nursing note reviewed.   Constitutional:       General: He is not in acute distress.  Cardiovascular:      Rate and Rhythm: Normal rate.   Pulmonary:      Effort: Pulmonary effort is normal.   Neurological:      Mental Status: He is alert.       Brief Urine Lab Results  (Last result in the past 365 days)        Color   Clarity   Blood   Leuk Est   Nitrite   Protein   CREAT   Urine HCG        08/16/24 1309 Yellow   Clear   Negative   Negative   Negative   Negative                     Assessment    ASSESSMENT    Problems Addressed this Visit    None  Visit Diagnoses       Dysuria    -  Primary    Relevant Orders    Urine Culture - Urine, Urine, Clean Catch    POC Urinalysis Dipstick, Automated (Completed)          Diagnoses         Codes Comments    Dysuria    -  Primary ICD-10-CM: R30.0  ICD-9-CM: 788.1             PLAN   UA is negative, denies risk for STD. Push fluids and empty bladder regularly, will send a urine culture   Recommend imaging with US or CT if no improvement and normal culture and referral to urology  Pt advised to go to the ER  over the weekend for pain or if he is unable to urinate

## 2024-08-18 LAB
BACTERIA UR CULT: NO GROWTH
BACTERIA UR CULT: NORMAL

## 2024-08-19 ENCOUNTER — TELEPHONE (OUTPATIENT)
Dept: INTERNAL MEDICINE | Facility: CLINIC | Age: 24
End: 2024-08-19
Payer: OTHER GOVERNMENT

## 2024-08-19 NOTE — TELEPHONE ENCOUNTER
----- Message from Jyoti Aragon sent at 8/19/2024  8:15 AM EDT -----  Please notify patient that Urine culture is negative, if he still has symptoms, I can refer him to urology

## 2024-09-06 ENCOUNTER — OFFICE VISIT (OUTPATIENT)
Dept: INTERNAL MEDICINE | Facility: CLINIC | Age: 24
End: 2024-09-06
Payer: OTHER GOVERNMENT

## 2024-09-06 VITALS
RESPIRATION RATE: 16 BRPM | WEIGHT: 222 LBS | OXYGEN SATURATION: 98 % | BODY MASS INDEX: 33.65 KG/M2 | DIASTOLIC BLOOD PRESSURE: 80 MMHG | HEART RATE: 73 BPM | SYSTOLIC BLOOD PRESSURE: 142 MMHG | HEIGHT: 68 IN

## 2024-09-06 DIAGNOSIS — G89.29 CHRONIC LEFT-SIDED THORACIC BACK PAIN: ICD-10-CM

## 2024-09-06 DIAGNOSIS — M54.2 CHRONIC NECK PAIN: ICD-10-CM

## 2024-09-06 DIAGNOSIS — R93.7 ABNORMAL X-RAY OF THORACIC SPINE: ICD-10-CM

## 2024-09-06 DIAGNOSIS — M54.6 CHRONIC LEFT-SIDED THORACIC BACK PAIN: ICD-10-CM

## 2024-09-06 DIAGNOSIS — G89.29 CHRONIC NECK PAIN: ICD-10-CM

## 2024-09-06 DIAGNOSIS — M54.50 CHRONIC LEFT-SIDED LOW BACK PAIN WITHOUT SCIATICA: Primary | ICD-10-CM

## 2024-09-06 DIAGNOSIS — G89.29 CHRONIC LEFT-SIDED LOW BACK PAIN WITHOUT SCIATICA: Primary | ICD-10-CM

## 2024-09-06 PROCEDURE — 99213 OFFICE O/P EST LOW 20 MIN: CPT | Performed by: NURSE PRACTITIONER

## 2024-09-06 NOTE — PROGRESS NOTES
Subjective   Akua Rivas is a 23 y.o. male. Patient is here today for   Chief Complaint   Patient presents with    Back Pain    Neck Pain          Vitals:    09/06/24 1251   BP: 142/80   Pulse: 73   Resp: 16   SpO2: 98%     Body mass index is 33.75 kg/m².  The following portions of the patient's history were reviewed and updated as appropriate: allergies, current medications, past family history, past medical history, past social history, past surgical history and problem list.    Past Medical History:   Diagnosis Date    Asthma     Sleep apnea       No Known Allergies   Social History     Socioeconomic History    Marital status: Single   Tobacco Use    Smoking status: Never    Smokeless tobacco: Never   Vaping Use    Vaping status: Never Used   Substance and Sexual Activity    Alcohol use: Never    Drug use: Never        Current Outpatient Medications:     dicyclomine (BENTYL) 10 MG capsule, Take 1 capsule by mouth 4 (Four) Times a Day Before Meals & at Bedtime As Needed (stomach cramping)., Disp: 120 capsule, Rfl: 3     Objective     History of Present Illness  Akua is a 23 year old male patient who is here for an acute visit. He c/o neck pain, left sided thoracic back pain, and left low back pain for more than 3 years. He is working a physical job and it has progressively gotten worse over the last year. He had a neck injury playing football at age 14 but did not have surgery  He did go to the urgent care for neck pain in 2021 but has not had imaging or been evaluated by PCP or specialist.   Back Pain  This is a chronic (neck to me) problem. The problem occurs intermittently. The problem has been worse since onset. The quality of the pain is described as aching. The pain radiates to the left foot and right foot. The pain is moderate. The symptoms are aggravated by bending, twisting and position (lifting, is worse after work). Pertinent negatives include no abdominal pain, bladder incontinence, dysuria,  fever, leg pain, numbness, paresthesias, tingling or weakness. Risk factors include obesity.      From feet to back to neck     Review of Systems   Constitutional:  Negative for fatigue and fever.   Respiratory: Negative.     Cardiovascular: Negative.    Gastrointestinal:  Negative for abdominal pain.   Genitourinary:  Negative for bladder incontinence, difficulty urinating and dysuria.   Musculoskeletal:  Positive for back pain and neck pain.   Neurological:  Negative for tingling, weakness, numbness and paresthesias.       Physical Exam  Vitals and nursing note reviewed.   Constitutional:       General: He is not in acute distress.  HENT:      Head: Normocephalic.   Cardiovascular:      Rate and Rhythm: Normal rate.   Pulmonary:      Effort: Pulmonary effort is normal.   Musculoskeletal:      Cervical back: No swelling, edema, erythema or bony tenderness.      Thoracic back: Normal. No bony tenderness.      Lumbar back: Normal. No bony tenderness. Negative right straight leg raise test and negative left straight leg raise test.   Neurological:      Mental Status: He is alert.         Assessment    ASSESSMENT    Diagnoses and all orders for this visit:    1. Chronic left-sided low back pain without sciatica (Primary)  -     Ambulatory Referral to Physical Therapy for Evaluation & Treatment  -     Ambulatory Referral to Orthopedic Surgery  -     XR Spine Lumbar Complete 4+VW    2. Chronic neck pain  -     Ambulatory Referral to Physical Therapy for Evaluation & Treatment  -     Ambulatory Referral to Orthopedic Surgery  -     XR Spine Cervical Complete 4 or 5 View    3. Chronic left-sided thoracic back pain  -     Ambulatory Referral to Physical Therapy for Evaluation & Treatment  -     Ambulatory Referral to Orthopedic Surgery  -     XR Spine Thoracic 3 View (In Office)  -     MRI thoracic spine wo contrast; Future    4. Abnormal x-ray of thoracic spine  -     MRI thoracic spine wo contrast;  Future          PLAN    Will get xrays today and refer to ortho and for PT  Consider MRI pending xray report and PT  Recommend tylenol or motrin as needed  Apply heat or ice as needed  Offered muscle relaxer but pt declined   Information added to AVS   Work note given   Return if symptoms worsen or fail to improve.    Addendum   Xray of the thoracic spine showed mild disc space narrowing . There is a depression of the t6 endplate which is concerning for a compression deformity and an MRI of the thoracic spine is recommended and order was placed   The cervical and lumbar spine are normal

## 2024-09-09 ENCOUNTER — TELEPHONE (OUTPATIENT)
Dept: INTERNAL MEDICINE | Facility: CLINIC | Age: 24
End: 2024-09-09
Payer: OTHER GOVERNMENT

## 2024-09-09 DIAGNOSIS — G89.29 CHRONIC LEFT-SIDED THORACIC BACK PAIN: ICD-10-CM

## 2024-09-09 DIAGNOSIS — M54.6 CHRONIC LEFT-SIDED THORACIC BACK PAIN: ICD-10-CM

## 2024-09-09 DIAGNOSIS — R93.7 ABNORMAL X-RAY OF THORACIC SPINE: Primary | ICD-10-CM

## 2024-09-09 NOTE — TELEPHONE ENCOUNTER
Please call Akua and let him know that I reviewed xray results  Xray of the thoracic spine showed mild disc space narrowing . There is a depression of the t6 endplate which is concerning for a compression deformity and an MRI of the thoracic spine is recommended. I placed the order.   The cervical and lumbar spine are normal

## 2024-09-17 ENCOUNTER — PATIENT ROUNDING (BHMG ONLY) (OUTPATIENT)
Dept: SPORTS MEDICINE | Facility: CLINIC | Age: 24
End: 2024-09-17
Payer: OTHER GOVERNMENT

## 2024-09-17 ENCOUNTER — OFFICE VISIT (OUTPATIENT)
Dept: SPORTS MEDICINE | Facility: CLINIC | Age: 24
End: 2024-09-17
Payer: OTHER GOVERNMENT

## 2024-09-17 VITALS
OXYGEN SATURATION: 96 % | HEIGHT: 68 IN | RESPIRATION RATE: 14 BRPM | BODY MASS INDEX: 33.65 KG/M2 | SYSTOLIC BLOOD PRESSURE: 155 MMHG | TEMPERATURE: 98 F | WEIGHT: 222 LBS | HEART RATE: 83 BPM | DIASTOLIC BLOOD PRESSURE: 98 MMHG

## 2024-09-17 DIAGNOSIS — M54.2 CHRONIC NECK PAIN: ICD-10-CM

## 2024-09-17 DIAGNOSIS — M21.42 PES PLANUS OF BOTH FEET: Primary | ICD-10-CM

## 2024-09-17 DIAGNOSIS — R29.898 WEAKNESS OF BOTH HIPS: ICD-10-CM

## 2024-09-17 DIAGNOSIS — M54.50 CHRONIC BILATERAL LOW BACK PAIN WITHOUT SCIATICA: ICD-10-CM

## 2024-09-17 DIAGNOSIS — M25.551 GREATER TROCHANTERIC PAIN SYNDROME OF BOTH LOWER EXTREMITIES: ICD-10-CM

## 2024-09-17 DIAGNOSIS — M25.552 GREATER TROCHANTERIC PAIN SYNDROME OF BOTH LOWER EXTREMITIES: ICD-10-CM

## 2024-09-17 DIAGNOSIS — M54.6 CHRONIC MIDLINE THORACIC BACK PAIN: ICD-10-CM

## 2024-09-17 DIAGNOSIS — G89.29 CHRONIC MIDLINE THORACIC BACK PAIN: ICD-10-CM

## 2024-09-17 DIAGNOSIS — M21.41 PES PLANUS OF BOTH FEET: Primary | ICD-10-CM

## 2024-09-17 DIAGNOSIS — G89.29 CHRONIC NECK PAIN: ICD-10-CM

## 2024-09-17 DIAGNOSIS — G89.29 CHRONIC BILATERAL LOW BACK PAIN WITHOUT SCIATICA: ICD-10-CM

## 2024-09-17 PROCEDURE — 99244 OFF/OP CNSLTJ NEW/EST MOD 40: CPT | Performed by: STUDENT IN AN ORGANIZED HEALTH CARE EDUCATION/TRAINING PROGRAM

## 2024-09-17 NOTE — LETTER
October 7, 2024       No Recipients    Patient: Akua Rivas   YOB: 2000   Date of Visit: 9/17/2024       Dear PIPPA Luz,    Thank you for referring Akua Rivas to me for evaluation. Below is a copy of my consult note.    If you have questions, please do not hesitate to call me. I look forward to following Akua along with you.         Sincerely,        Cinthia Dumont,         CC:   No Recipients    Akua is a 23 y.o. 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 year old male here today accompanied by his spouse for neck and spine pain that has been present for years with no known injury or trauma.  History of Present Illness  He has been experiencing persistent back pain, including in the neck, middle, and lower back regions, for several years. The onset of this pain coincided with the start of his employment at [employer] approximately 4 to 5 years ago. The pain developed gradually over time. 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 Currently, he rates his pain as 5 out of 10, which fluctuates in intensity. The pain is most severe in the middle of his back and is exacerbated by standing.    He reports no numbness or tingling in his hands or feet but mentions a feeling of weakness on his left side, but he thinks that is mainly because he is right-handed. He does not report any changes in bowel or bladder function or numbness in the groin area. He occasionally takes Tylenol for pain relief, which provides temporary respite before the pain returns  "the following day. He also experiences foot pain, which has led him to use insoles. He does not use arch support and typically wears tennis shoes at work. He experiences a tingling sensation in his knee. He has an MRI of the thoracic spine scheduled for 10/01/2024.    He admits to a previous \"neck sprain\" when he was a kid from throwing a ball over a roof, which rendered him unable to move his neck. Symptoms completely resolved.       The following data was reviewed by: Cinthia Dumont DO on 09/17/2024:  Data reviewed : FM note from 9/6/24            Lab Results   Component Value Date    GLUCOSE 109 (H) 07/12/2024    BUN 12 07/12/2024    CREATININE 1.10 07/12/2024     07/12/2024    K 4.4 07/12/2024     07/12/2024    CALCIUM 9.6 07/12/2024    ALBUMIN 4.7 07/12/2024    ALT 45 (H) 07/12/2024    AST 22 07/12/2024    ALKPHOS 101 07/12/2024    BILITOT 0.6 07/12/2024    BCR 10.9 07/12/2024       /98 (BP Location: Left arm, Patient Position: Sitting, Cuff Size: Adult)   Pulse 83   Temp 98 °F (36.7 °C) (Infrared)   Resp 14   Ht 172.7 cm (68\")   Wt 101 kg (222 lb)   SpO2 96%   BMI 33.75 kg/m²        Physical Exam  Vital signs reviewed.   General: Well developed, well nourished; No acute distress.  Eyes: conjunctiva clear; pupils equally round and reactive  ENT: external ears and nose atraumatic; hearing normal  CV: no peripheral edema, 2+ distal pulses  Resp: normal respiratory effort, no use of accessory muscles  Skin: normal color and pigmentation; no rashes or wounds; normal turgor  Psych: alert and oriented; mood and affect appropriate; recent and remote memory intact    MSK Exam:  The spine (cervical, thoracic, and lumbar) is without obvious signs of deformity, scoliosis, erythema, or ecchymosis. Poor posture. There is no midline tenderness. Range of motion (cervical, thoracic, and lumbar) is WNL in all directions. Distal lower extremity strength (ankle, knee, extension of the great toe) is 5/5, " pain free, and symmetrical. Upper extremity strength (including , interosseus, and pincer strength) is 5/5, symmetrical, and pain-free. Patellar and biceps reflexes are 2+ and equal. Sensation is intact and equal to light touch. Sphinx test is negative. Straight leg raise and seated slump tests are negative. Spurling's test is negative.    There is pelvic bony tenderness at the greater trochanters bilaterally. Active and passive range of motion are normal and painless. Resisted SLR is weak bilaterally (4-/5). DYAN and FADIR are negative. Side-lying hip strength is 3+/5 bilaterally. Trendelenburg is positive. Poor quad flexibility. Pes planus bilaterally.      Assessment and Plan  Diagnoses and all orders for this visit:    1. Pes planus of both feet (Primary)    2. Chronic bilateral low back pain without sciatica    3. Greater trochanteric pain syndrome of both lower extremities    4. Weakness of both hips    5. Chronic midline thoracic back pain    6. Chronic neck pain    Akua is a 23 y.o. year old male here today for neck and spine pain that has been present for years with no known injury or trauma. We reviewed images and exam findings.   Assessment & Plan  The x-ray results show possible compression deformity at T6, but no other significant findings. On exam, there is notable hip weakness, but no concerning neurologic findings. Agree with physical therapy to work on range of motion and strengthening. I stressed the importance of appropriate footwear to provide adequate support.  Should avoid being barefoot, as well as shoes that lack support, such as sandals, slides, flip-flops, flats, loafers, heels, etc. May benefit from use of orthotics for additional support, with a gradual increase in usage over a period of 4 to 6 weeks. OTC NSAIDs were recommended and Tylenol (acetaminophen) can be taken concurrently if needed. Also recommended heat application, such as heating pads or hot showers, to help alleviate  chronic pain. The MRI of the thoracic spine is pending and results will be reviewed once available.     Follow-up  An 8-week follow-up appointment is scheduled.  All of his questions were answered and he is agreeable with the plan.    Dictated utilizing Dragon dictation.  Patient or patient representative verbalized consent for the use of Ambient Listening during the visit with  Cinthia Dumont DO for chart documentation. 9/17/2024  9:29 EDT

## 2024-09-17 NOTE — PROGRESS NOTES
"Akua is a 23 y.o. 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 year old male here today accompanied by his spouse for neck and spine pain that has been present for years with no known injury or trauma.  History of Present Illness  He has been experiencing persistent back pain, including in the neck, middle, and lower back regions, for several years. The onset of this pain coincided with the start of his employment at [employer] approximately 4 to 5 years ago. The pain developed gradually over time. 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 Currently, he rates his pain as 5 out of 10, which fluctuates in intensity. The pain is most severe in the middle of his back and is exacerbated by standing.    He reports no numbness or tingling in his hands or feet but mentions a feeling of weakness on his left side, but he thinks that is mainly because he is right-handed. He does not report any changes in bowel or bladder function or numbness in the groin area. He occasionally takes Tylenol for pain relief, which provides temporary respite before the pain returns the following day. He also experiences foot pain, which has led him to use insoles. He does not use arch support and typically wears tennis shoes at work. He experiences a tingling sensation in his knee. He has an MRI of the thoracic spine scheduled for 10/01/2024.    He admits to a previous \"neck sprain\" when he was a kid from throwing a ball over a roof, which rendered him unable to move his neck. Symptoms completely resolved.   " "    The following data was reviewed by: Cinthia Dumont DO on 09/17/2024:  Data reviewed : FM note from 9/6/24            Lab Results   Component Value Date    GLUCOSE 109 (H) 07/12/2024    BUN 12 07/12/2024    CREATININE 1.10 07/12/2024     07/12/2024    K 4.4 07/12/2024     07/12/2024    CALCIUM 9.6 07/12/2024    ALBUMIN 4.7 07/12/2024    ALT 45 (H) 07/12/2024    AST 22 07/12/2024    ALKPHOS 101 07/12/2024    BILITOT 0.6 07/12/2024    BCR 10.9 07/12/2024       /98 (BP Location: Left arm, Patient Position: Sitting, Cuff Size: Adult)   Pulse 83   Temp 98 °F (36.7 °C) (Infrared)   Resp 14   Ht 172.7 cm (68\")   Wt 101 kg (222 lb)   SpO2 96%   BMI 33.75 kg/m²        Physical Exam  Vital signs reviewed.   General: Well developed, well nourished; No acute distress.  Eyes: conjunctiva clear; pupils equally round and reactive  ENT: external ears and nose atraumatic; hearing normal  CV: no peripheral edema, 2+ distal pulses  Resp: normal respiratory effort, no use of accessory muscles  Skin: normal color and pigmentation; no rashes or wounds; normal turgor  Psych: alert and oriented; mood and affect appropriate; recent and remote memory intact    MSK Exam:  The spine (cervical, thoracic, and lumbar) is without obvious signs of deformity, scoliosis, erythema, or ecchymosis. Poor posture. There is no midline tenderness. Range of motion (cervical, thoracic, and lumbar) is WNL in all directions. Distal lower extremity strength (ankle, knee, extension of the great toe) is 5/5, pain free, and symmetrical. Upper extremity strength (including , interosseus, and pincer strength) is 5/5, symmetrical, and pain-free. Patellar and biceps reflexes are 2+ and equal. Sensation is intact and equal to light touch. Sphinx test is negative. Straight leg raise and seated slump tests are negative. Spurling's test is negative.    There is pelvic bony tenderness at the greater trochanters bilaterally. Active and " passive range of motion are normal and painless. Resisted SLR is weak bilaterally (4-/5). DYAN and FADIR are negative. Side-lying hip strength is 3+/5 bilaterally. Trendelenburg is positive. Poor quad flexibility. Pes planus bilaterally.      Assessment and Plan  Diagnoses and all orders for this visit:    1. Pes planus of both feet (Primary)    2. Chronic bilateral low back pain without sciatica    3. Greater trochanteric pain syndrome of both lower extremities    4. Weakness of both hips    5. Chronic midline thoracic back pain    6. Chronic neck pain    Akua is a 23 y.o. year old male here today for neck and spine pain that has been present for years with no known injury or trauma. We reviewed images and exam findings.   Assessment & Plan  The x-ray results show possible compression deformity at T6, but no other significant findings. On exam, there is notable hip weakness, but no concerning neurologic findings. Agree with physical therapy to work on range of motion and strengthening. I stressed the importance of appropriate footwear to provide adequate support.  Should avoid being barefoot, as well as shoes that lack support, such as sandals, slides, flip-flops, flats, loafers, heels, etc. May benefit from use of orthotics for additional support, with a gradual increase in usage over a period of 4 to 6 weeks. OTC NSAIDs were recommended and Tylenol (acetaminophen) can be taken concurrently if needed. Also recommended heat application, such as heating pads or hot showers, to help alleviate chronic pain. The MRI of the thoracic spine is pending and results will be reviewed once available.     Follow-up  An 8-week follow-up appointment is scheduled.  All of his questions were answered and he is agreeable with the plan.    Dictated utilizing Dragon dictation.  Patient or patient representative verbalized consent for the use of Ambient Listening during the visit with  Cinthia Dumont DO for chart documentation.  9/17/2024  9:29 EDT

## 2024-10-01 ENCOUNTER — HOSPITAL ENCOUNTER (OUTPATIENT)
Dept: MRI IMAGING | Facility: HOSPITAL | Age: 24
Discharge: HOME | End: 2024-10-01
Admitting: NURSE PRACTITIONER
Payer: OTHER GOVERNMENT

## 2024-10-01 DIAGNOSIS — M54.6 CHRONIC LEFT-SIDED THORACIC BACK PAIN: ICD-10-CM

## 2024-10-01 DIAGNOSIS — G89.29 CHRONIC LEFT-SIDED THORACIC BACK PAIN: ICD-10-CM

## 2024-10-01 DIAGNOSIS — R93.7 ABNORMAL X-RAY OF THORACIC SPINE: ICD-10-CM

## 2024-10-01 PROCEDURE — 72146 MRI CHEST SPINE W/O DYE: CPT

## 2024-10-14 ENCOUNTER — TREATMENT (OUTPATIENT)
Age: 24
End: 2024-10-14
Payer: OTHER GOVERNMENT

## 2024-10-14 DIAGNOSIS — G89.29 CHRONIC LEFT-SIDED THORACIC BACK PAIN: ICD-10-CM

## 2024-10-14 DIAGNOSIS — M54.2 CHRONIC NECK PAIN: ICD-10-CM

## 2024-10-14 DIAGNOSIS — M54.50 CHRONIC LEFT-SIDED LOW BACK PAIN WITHOUT SCIATICA: Primary | ICD-10-CM

## 2024-10-14 DIAGNOSIS — M54.6 CHRONIC LEFT-SIDED THORACIC BACK PAIN: ICD-10-CM

## 2024-10-14 DIAGNOSIS — G89.29 CHRONIC NECK PAIN: ICD-10-CM

## 2024-10-14 DIAGNOSIS — G89.29 CHRONIC LEFT-SIDED LOW BACK PAIN WITHOUT SCIATICA: Primary | ICD-10-CM

## 2024-10-14 PROCEDURE — 97163 PT EVAL HIGH COMPLEX 45 MIN: CPT | Performed by: PHYSICAL THERAPIST

## 2024-10-14 PROCEDURE — 97110 THERAPEUTIC EXERCISES: CPT | Performed by: PHYSICAL THERAPIST

## 2024-10-14 PROCEDURE — 97530 THERAPEUTIC ACTIVITIES: CPT | Performed by: PHYSICAL THERAPIST

## 2024-10-14 NOTE — PATIENT INSTRUCTIONS
Access Code: [email or web address]  URL: [email or web address]  Date: 10/14/2024  Prepared by: Patricia Molina    Exercises  - Supine Piriformis Stretch with Foot on Ground  - 2 x daily - 7 x weekly - 1 sets - 2 reps - 20 sec. hold  - Seated Piriformis Stretch  - 2 x daily - 7 x weekly - 1 sets - 2 reps - 30 sec. hold  - Hooklying Active Hamstring Stretch  - 2 x daily - 7 x weekly - 2 sets - 10 reps  - Sidelying Open Book Thoracic Lumbar Rotation and Extension  - 2 x daily - 7 x weekly - 2 sets - 10 reps  - Cat Cow to Child's Pose  - 1 x daily - 7 x weekly - 1 sets - 5 reps - 10 sec. hold  - Standing Gastroc Stretch on Foam 1/2 Roll  - 1 x daily - 7 x weekly - 1 sets - 2 reps - 20 sec. hold  - Standing Soleus Stretch on Foam 1/2 Roll  - 1 x daily - 7 x weekly - 1 sets - 2 reps - 20 sec. hold

## 2024-10-14 NOTE — PROGRESS NOTES
Physical Therapy Initial Evaluation and Plan of Care  [facility]   [address] [place] [address]  P: [phone]       F: [phone]       Patient: Akua Rivas   : 2000  Visit Diagnoses:     ICD-10-CM ICD-9-CM   1. Chronic left-sided low back pain without sciatica  M54.50 724.2    G89.29 338.29   2. Chronic neck pain  M54.2 723.1    G89.29 338.29   3. Chronic left-sided thoracic back pain  M54.6 724.1    G89.29 338.29     Referring practitioner: PIPPA Luz  Date of Initial Visit: 10/14/2024  Today's Date: 10/14/2024  Patient seen for 1 sessions           Subjective Questionnaire: NDI:  Oswestry:       Subjective Evaluation    History of Present Illness  Mechanism of injury: Patient reports he has been having pain in his neck and back for about 4 yrs.  States he had a neck injury when he was younger.      States his pain has been gradually worsening over the past 4 yrs while at his current job.      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      Reports the low back is the worst.  Neck started if standing for a long time.  No N/T. Occasional cramps in left low back.      Patient Occupation:  for [employer]  At best pain rating: 3  At worst pain ratin  Quality: sharp  Relieving factors: rest and change in position  Aggravating factors: lifting, standing, ambulation, prolonged positioning and repetitive movement (carrying, bending)  Progression: improved (after taking a couple days off work to rest.)    Social Support  Lives in: apartment  Lives with: childcare care tasks 4 yr old and 1 yr old.    Hand dominance: right    Treatments  Previous treatment: chiropractic  Patient Goals  Patient goals for therapy: decreased pain  Patient goal: To be able to tolerate activities better.       Past Medical History:   Diagnosis Date    Asthma     Sleep apnea      Past Surgical  History:   Procedure Laterality Date    TONSILLECTOMY      UMBILICAL HERNIA REPAIR           Objective          Palpation   Left   Hypertonic in the iliopsoas, lumbar paraspinals and quadratus lumborum.   Tenderness of the iliopsoas, levator scapulae, lower trapezius, lumbar paraspinals, quadratus lumborum and thoracic paraspinals.   Trigger point to levator scapulae and lower trapezius.     Right   Hypertonic in the lumbar paraspinals and quadratus lumborum. Tenderness of the levator scapulae, lumbar paraspinals, quadratus lumborum and thoracic paraspinals.   Trigger point to levator scapulae.     Active Range of Motion   Cervical/Thoracic Spine   Cervical    Flexion: 55 degrees   Extension: 50 degrees   Left lateral flexion: 40 degrees   Right lateral flexion: 40 degrees   Left rotation: 60 degrees   Right rotation: 60 degrees     Lumbar   Flexion: 30 degrees with pain  Extension: 25 degrees   Left lateral flexion: 25 degrees with pain  Right lateral flexion: 25 degrees   Left rotation: 60 degrees with pain  Right rotation: 60 degrees     Strength/Myotome Testing   Cervical Spine   Neck extension: 5  Neck flexion: 5    Left   Normal strength    Right   Normal strength    Left Hip   Planes of Motion   Flexion: 3+  Extension: 3+  Abduction: 4  External rotation: 5  Internal rotation: 5    Right Hip   Planes of Motion   Flexion: 3+  Extension: 3+  Abduction: 4  External rotation: 5  Internal rotation: 5    Left Knee   Flexion: 5  Extension: 5    Right Knee   Flexion: 5  Extension: 5    Left Ankle/Foot   Dorsiflexion: 5    Right Ankle/Foot   Dorsiflexion: 5    Muscle Activation     Additional Muscle Activation Details  Fair core muscle stabilization.    Tests       Thoracic   Positive slump.     Lumbar     Left   Positive passive SLR.     Right   Negative passive SLR.     Lumbar Flexibility Comments:   SLR: Left 50 degrees, Right 70 degrees    Decreased bilateral hip girdle and lower extremity muscle flexibility.           Assessment & Plan       Assessment  Impairments: abnormal muscle tone, activity intolerance, impaired physical strength, lacks appropriate home exercise program and pain with function   Functional limitations: carrying objects, lifting, walking, uncomfortable because of pain and standing (Bending)  Assessment details: Akua Rivas is a pleasant 24y.o. male that presents with cervical and lumbar spine range of motion within normal limits.  He does have some discomfort with lumbar and thoracic range of motion, decreased core and hip muscle strength, decreased lower extremity and hip girdle muscle flexibility, and symptom limited tolerance to functional activities: ADLs/IADLs/childcare duties/work duties. Pt will benefit from skilled PT services in order to address listed impairments, decrease pain and restore function.  Due to high co-pay per visit he will have limited session frequency with heavy reliance on HEP.    Prognosis: good  Prognosis details: Patient demonstrates good rehab potential as evidenced by high motivation to participate with PT POC and to return to PLOF/ADLs/IADLs/Work tasks.    Goals  Plan Goals: Short Term Goals (1 wks):  1.  Instruct patient in posture and positioning.  2.  Instruct patient in body mechanics and ergonomics.  3.  Instructed patient in proper lifting mechanics.  4.  Initiate HEP education.    Long Term Goals (4-6 wks):  1.  Patient will be independent in performance of HEP for carryover upon discharge from skilled PT services.  2.  Patient have increased left SLR to 70 degrees.  3.  Patient will have increased hip girdle and lower extremity muscle flexibility to within functional limits.  4.  Patient will have increased hip strength to 4+/5.      Plan  Therapy options: will be seen for skilled therapy services  Planned modality interventions: cryotherapy, dry needling and thermotherapy (hydrocollator packs)  Planned therapy interventions: manual therapy, postural training,  soft tissue mobilization, spinal/joint mobilization, strengthening, stretching, flexibility, functional ROM exercises, home exercise program, neuromuscular re-education, therapeutic activities, body mechanics training and abdominal trunk stabilization  Frequency: 1x week  Duration in weeks: 6  Treatment plan discussed with: patient  Plan details: Pt was educated on the importance of their HEP and their current need for continued skilled physical therapy. Patients goals and potential limitations were discussed and pt is in agreement with current plan of care and treatment emphasis.              History # of Personal Factors and/or Comorbidities: MODERATE (1-2)  Examination of Body System(s): # of elements: HIGH (4+)  Clinical Presentation: STABLE   Clinical Decision Making: HIGH      Timed:         Manual Therapy:         mins  86322;     Therapeutic Exercise:    30     mins  85029;     Neuromuscular Bertha:        mins  15480;    Therapeutic Activity:     10     mins  58346;     Gait Training:           mins  65694;     Ultrasound:          mins  33238;    Ionto                                  mins  20280  Self Care                            mins  36928  Canalith Repos         mins  43950  Orthotic MGMT/Train         mins  01479    Un-Timed:  Electrical Stimulation:         mins  89142 ( );  Dry Needling:          mins  75850 self-pay;  Dry Needling:          mins  49846 self-pay  Traction          mins  57725  Low Eval          mins  61580  Mod Eval          mins  11425  High Eval                       30     mins  48681    Timed Treatment:   40   mins   Total Treatment:     70   mins      PT SIGNATURE: Patricia Molina PT     License Number: [id number]  Electronically signed by Patricia Molina PT, 10/14/24, 1:33 PM EDT      DATE TREATMENT INITIATED: 10/14/2024    Initial Certification  Certification Period: 10/14/2024 thru 1/11/2025  I certify that the therapy services are furnished while this patient is under  my care.  The services outlined above are required by this patient, and will be reviewed every 90 days.     PHYSICIAN: Jyoti Aragon APRN      NPI: [id number]  DATE:         Please sign and return via fax to [phone]. Thank you, [facility].

## 2024-11-05 ENCOUNTER — TELEPHONE (OUTPATIENT)
Age: 24
End: 2024-11-05

## 2024-11-05 NOTE — TELEPHONE ENCOUNTER
Caller: Akua Rivas    Relationship: Self         What was the call regarding: STOMACH BUG

## 2024-11-12 ENCOUNTER — OFFICE VISIT (OUTPATIENT)
Dept: SPORTS MEDICINE | Facility: CLINIC | Age: 24
End: 2024-11-12
Payer: OTHER GOVERNMENT

## 2024-11-12 VITALS — BODY MASS INDEX: 33.65 KG/M2 | HEIGHT: 68 IN | WEIGHT: 222 LBS | TEMPERATURE: 98 F

## 2024-11-12 DIAGNOSIS — M21.41 PES PLANUS OF BOTH FEET: ICD-10-CM

## 2024-11-12 DIAGNOSIS — G89.29 CHRONIC NECK PAIN: ICD-10-CM

## 2024-11-12 DIAGNOSIS — M54.2 CHRONIC NECK PAIN: ICD-10-CM

## 2024-11-12 DIAGNOSIS — M25.552 GREATER TROCHANTERIC PAIN SYNDROME OF BOTH LOWER EXTREMITIES: ICD-10-CM

## 2024-11-12 DIAGNOSIS — G89.29 CHRONIC BILATERAL LOW BACK PAIN WITHOUT SCIATICA: Primary | ICD-10-CM

## 2024-11-12 DIAGNOSIS — R29.898 WEAKNESS OF BOTH HIPS: ICD-10-CM

## 2024-11-12 DIAGNOSIS — M54.50 CHRONIC BILATERAL LOW BACK PAIN WITHOUT SCIATICA: Primary | ICD-10-CM

## 2024-11-12 DIAGNOSIS — M54.6 CHRONIC MIDLINE THORACIC BACK PAIN: ICD-10-CM

## 2024-11-12 DIAGNOSIS — M21.42 PES PLANUS OF BOTH FEET: ICD-10-CM

## 2024-11-12 DIAGNOSIS — G89.29 CHRONIC MIDLINE THORACIC BACK PAIN: ICD-10-CM

## 2024-11-12 DIAGNOSIS — M25.551 GREATER TROCHANTERIC PAIN SYNDROME OF BOTH LOWER EXTREMITIES: ICD-10-CM

## 2024-11-12 PROCEDURE — 99213 OFFICE O/P EST LOW 20 MIN: CPT | Performed by: STUDENT IN AN ORGANIZED HEALTH CARE EDUCATION/TRAINING PROGRAM

## 2024-11-12 NOTE — PROGRESS NOTES
"Chief Complaint   Patient presents with    Lumbar Spine - Pain    Cervical Spine - Pain       History of Present Illness  Akua is a 24 y.o. year old male here today for follow-up of neck and spine pain that has been present for years with no known injury or trauma. The x-rays from 9/6/24 showed possible compression deformity at T6, but no other significant findings. On exam, there was notable hip weakness, but no concerning neurologic findings. I recommended conservative management with a focus on PT. Please see previous notes for complete history and treatment course.   History of Present Illness  He reports persistent stiffness in his neck and lower back, although his mid-back condition has improved. His pain intensifies after work and when standing for extended periods. He also experiences stiffness when bending over for prolonged periods, such as when changing his son's diaper. Additionally, he notes a crunching sensation when stretching his neck.    He has been attending physical therapy sessions, but due to the high cost of $50 per visit, he was advised to perform stretches at home. He was unable to attend his last session due to illness. The prescribed exercises focus on stretching his hips and back, which he finds beneficial. However, he does not perform these exercises daily.    He reports no numbness or tingling in his hands or feet. He is not currently taking any medication for pain.          Temp 98 °F (36.7 °C) (Infrared)   Ht 172.7 cm (68\")   Wt 101 kg (222 lb)   BMI 33.75 kg/m²        Physical Exam  MSK Exam:  The spine (cervical, thoracic, and lumbar) is without obvious signs of deformity, scoliosis, erythema, or ecchymosis. Poor posture. There is no midline tenderness. Range of motion (cervical, thoracic, and lumbar) is WNL in all directions. Distal lower extremity strength (ankle, knee, extension of the great toe) is 5/5, pain free, and symmetrical. Upper extremity strength (including , " interosseus, and pincer strength) is 5/5, symmetrical, and pain-free. Patellar and biceps reflexes are 2+ and equal. Sensation is intact and equal to light touch. Sphinx test is negative. Straight leg raise and seated slump tests are negative. Spurling's test is negative.    There is pelvic bony tenderness at the greater trochanters bilaterally. Active and passive range of motion are normal and painless. Resisted SLR is weak bilaterally (4-/5). DYAN and FADIR are negative. Trendelenburg is positive. Poor quad flexibility. Pes planus bilaterally. No significant change compared to previous exam.      Assessment and Plan  Diagnoses and all orders for this visit:    1. Chronic bilateral low back pain without sciatica (Primary)    2. Greater trochanteric pain syndrome of both lower extremities    3. Weakness of both hips    4. Chronic midline thoracic back pain    5. Chronic neck pain    6. Pes planus of both feet      Akua is a 24 y.o. year old male here today for follow-up of neck and spine pain that has been present for years with no known injury or trauma. The x-rays from 9/6/24 showed possible compression deformity at T6, but no other significant findings. On exam, there was notable hip weakness, but no concerning neurologic findings. I recommended conservative management with a focus on PT.   Assessment & Plan  1. Neck pain.  The patient reports ongoing stiffness in the neck area. Physical examination reveals no numbness, tingling, or significant weakness. Sensation and reflexes are intact. He experiences increased stiffness when bending over for prolonged periods, such as when changing his son's diaper. He is advised to continue with the stretching exercises provided by the physical therapist.    2. Lower back pain.  The patient reports improvement in his back pain but still experiences stiffness in the lower back, especially after prolonged standing or repetitive activities. Physical examination shows  tenderness and weakness in the hips but no significant pain radiating down the legs. He is advised to continue with the hip and back stretching exercises provided by the physical therapist. Additional hip strengthening exercises will be printed and provided to him to improve overall stability and reduce stiffness. He is encouraged to spread out physical therapy visits due to high co-pay costs and continue working on exercises at home.    We will follow-up in 8 weeks.  All of his questions were answered and he is agreeable with the plan.    Dictated utilizing Dragon dictation.  Patient or patient representative verbalized consent for the use of Ambient Listening during the visit with  Cinthia Dumont DO for chart documentation. 11/12/2024  12:29 EDT

## 2024-11-26 ENCOUNTER — TREATMENT (OUTPATIENT)
Age: 24
End: 2024-11-26
Payer: OTHER GOVERNMENT

## 2024-11-26 DIAGNOSIS — M54.50 CHRONIC LEFT-SIDED LOW BACK PAIN WITHOUT SCIATICA: Primary | ICD-10-CM

## 2024-11-26 DIAGNOSIS — G89.29 CHRONIC LEFT-SIDED THORACIC BACK PAIN: ICD-10-CM

## 2024-11-26 DIAGNOSIS — M54.2 CHRONIC NECK PAIN: ICD-10-CM

## 2024-11-26 DIAGNOSIS — G89.29 CHRONIC LEFT-SIDED LOW BACK PAIN WITHOUT SCIATICA: Primary | ICD-10-CM

## 2024-11-26 DIAGNOSIS — M54.6 CHRONIC LEFT-SIDED THORACIC BACK PAIN: ICD-10-CM

## 2024-11-26 DIAGNOSIS — G89.29 CHRONIC NECK PAIN: ICD-10-CM

## 2024-11-26 NOTE — PROGRESS NOTES
Physical Therapy Re-Assessment & Discharge Summary  [facility] [place]   [address] [place] [address]  P: [phone]       F: [phone]        Patient: Akua Rivas   : 2000  Visit Diagnoses:     ICD-10-CM ICD-9-CM   1. Chronic left-sided low back pain without sciatica  M54.50 724.2    G89.29 338.29   2. Chronic neck pain  M54.2 723.1    G89.29 338.29   3. Chronic left-sided thoracic back pain  M54.6 724.1    G89.29 338.29     Referring practitioner: PIPPA Luz  Date of Initial Visit: Type: THERAPY  Noted: 10/14/2024  Today's Date: 2024  Patient seen for 2 sessions      Subjective:   Akua Rivas reports:   Subjective Questionnaire: NDI:3/9  Oswestry: 3/50  Clinical Progress: improved  Home Program Compliance: Yes  Treatment has included: therapeutic exercise and therapeutic activity    Subjective Evaluation    Pain  Current pain ratin         Patient reports he has been focusing on a few stretches that he can do easily at work.  States he has not been having pain in his neck or back.  Reports he had some pain calf muscles and left knee last week.  Overall the stretching exercises are helping.      Objective     Palpation   Muscle tone is normal and no TTP.     Active Range of Motion   Cervical/Thoracic Spine   Cervical     WNL, painfree     Lumbar   WNL, painfree     Strength/Myotome Testing   Cervical Spine   Neck extension: 5  Neck flexion: 5     Left   Normal strength     Right   Normal strength     Left Hip   Planes of Motion   Flexion: 5  Extension: 5  Abduction: 5  External rotation: 5  Internal rotation: 5     Right Hip   Planes of Motion   Flexion: 5  Extension: 5  Abduction: 5  External rotation: 5  Internal rotation: 5     Left Knee   Flexion: 5  Extension: 5     Right Knee   Flexion: 5  Extension: 5     Left Ankle/Foot   Dorsiflexion: 5     Right Ankle/Foot   Dorsiflexion: 5     Muscle Activation      Additional Muscle  Activation Details  Good core muscle stabilization.     Tests         Thoracic   Negative slump.      Lumbar      Left   Negative passive SLR.      Right   Negative passive SLR.      Lumbar Flexibility Comments:   SLR: Left 80 degrees, Right 80 degrees     Bilateral hip girdle and lower extremity muscle flexibility WNL       See Exercise, Manual, and Modality Logs for complete treatment.     Assessment/Plan  Patient presents with pain-free and normal range of motion of cervical spine and lumbar spine.  Lower extremity muscle flexibility is within normal limits bilaterally.  He has negative straight leg raise and negative slump test.  He is independent in HEP and symptom management.  Progress toward previous goals: All Met    Goal Review  Short Term Goals:  1.  Instruct patient in posture and positioning.-Met  2.  Instruct patient in body mechanics and ergonomics.-Met  3.  Instructed patient in proper lifting mechanics.-Met  4.  Initiate HEP education.-Met     Long Term Goals:  1.  Patient will be independent in performance of HEP for carryover upon discharge from skilled PT services.-Met  2.  Patient have increased left SLR to 70 degrees.-Met  3.  Patient will have increased hip girdle and lower extremity muscle flexibility to within functional limits.-Met  4.  Patient will have increased hip strength to 4+/5.-Met      Recommendations: Discharge to HEP      PT SIGNATURE: Patricia Molina, PT     License Number: [id number]  Electronically signed by Patricia Molina, PT, 11/26/24, 2:02 PM EST        Timed:         Manual Therapy:         mins  25105;     Therapeutic Exercise:    10     mins  61508;     Neuromuscular Bertha:        mins  03081;    Therapeutic Activity:     15     mins  13258;     Gait Training:           mins  78417;     Ultrasound:          mins  29745;    Ionto                                  mins  62145  Self Care                            mins  83563  Canalith Repos         mins  07558  Orthotic  MGMT/Train         mins  32704    Un-Timed:  Electrical Stimulation:         mins  76436 ( );  Dry Needling:          mins  33388 self-pay;  Dry Needling:          mins  98898 self-pay  Traction          mins  29530  Low Eval          mins  03387  Mod Eval          mins  14172  High Eval                            mins  75359    Timed Treatment:   25   mins   Total Treatment:     25   mins

## 2025-07-15 DIAGNOSIS — Z00.00 ROUTINE HEALTH MAINTENANCE: Primary | ICD-10-CM

## 2025-07-21 LAB
ALBUMIN SERPL-MCNC: 4.6 G/DL (ref 3.5–5.2)
ALBUMIN/GLOB SERPL: 1.8 G/DL
ALP SERPL-CCNC: 87 U/L (ref 39–117)
ALT SERPL-CCNC: 24 U/L (ref 1–41)
APPEARANCE UR: CLEAR
AST SERPL-CCNC: 20 U/L (ref 1–40)
BACTERIA #/AREA URNS HPF: NORMAL /HPF
BASOPHILS # BLD AUTO: 0.03 10*3/MM3 (ref 0–0.2)
BASOPHILS NFR BLD AUTO: 0.3 % (ref 0–1.5)
BILIRUB SERPL-MCNC: 0.6 MG/DL (ref 0–1.2)
BILIRUB UR QL STRIP: NEGATIVE
BUN SERPL-MCNC: 10 MG/DL (ref 6–20)
BUN/CREAT SERPL: 10.2 (ref 7–25)
CALCIUM SERPL-MCNC: 9.9 MG/DL (ref 8.6–10.5)
CASTS URNS MICRO: NORMAL
CHLORIDE SERPL-SCNC: 106 MMOL/L (ref 98–107)
CHOLEST SERPL-MCNC: 157 MG/DL (ref 0–200)
CO2 SERPL-SCNC: 26.2 MMOL/L (ref 22–29)
COLOR UR: YELLOW
CREAT SERPL-MCNC: 0.98 MG/DL (ref 0.76–1.27)
EGFRCR SERPLBLD CKD-EPI 2021: 110.4 ML/MIN/1.73
EOSINOPHIL # BLD AUTO: 0.47 10*3/MM3 (ref 0–0.4)
EOSINOPHIL NFR BLD AUTO: 4.2 % (ref 0.3–6.2)
EPI CELLS #/AREA URNS HPF: NORMAL /HPF
ERYTHROCYTE [DISTWIDTH] IN BLOOD BY AUTOMATED COUNT: 13.6 % (ref 12.3–15.4)
GLOBULIN SER CALC-MCNC: 2.5 GM/DL
GLUCOSE SERPL-MCNC: 91 MG/DL (ref 65–99)
GLUCOSE UR QL STRIP: NEGATIVE
HCT VFR BLD AUTO: 46.3 % (ref 37.5–51)
HDLC SERPL-MCNC: 40 MG/DL (ref 40–60)
HGB BLD-MCNC: 14.8 G/DL (ref 13–17.7)
HGB UR QL STRIP: NEGATIVE
IMM GRANULOCYTES # BLD AUTO: 0.02 10*3/MM3 (ref 0–0.05)
IMM GRANULOCYTES NFR BLD AUTO: 0.2 % (ref 0–0.5)
KETONES UR QL STRIP: NEGATIVE
LDLC SERPL CALC-MCNC: 102 MG/DL (ref 0–100)
LDLC/HDLC SERPL: 2.53 {RATIO}
LEUKOCYTE ESTERASE UR QL STRIP: NEGATIVE
LYMPHOCYTES # BLD AUTO: 4.75 10*3/MM3 (ref 0.7–3.1)
LYMPHOCYTES NFR BLD AUTO: 42.1 % (ref 19.6–45.3)
MCH RBC QN AUTO: 27 PG (ref 26.6–33)
MCHC RBC AUTO-ENTMCNC: 32 G/DL (ref 31.5–35.7)
MCV RBC AUTO: 84.5 FL (ref 79–97)
MONOCYTES # BLD AUTO: 1.02 10*3/MM3 (ref 0.1–0.9)
MONOCYTES NFR BLD AUTO: 9 % (ref 5–12)
NEUTROPHILS # BLD AUTO: 4.99 10*3/MM3 (ref 1.7–7)
NEUTROPHILS NFR BLD AUTO: 44.2 % (ref 42.7–76)
NITRITE UR QL STRIP: NEGATIVE
NRBC BLD AUTO-RTO: 0 /100 WBC (ref 0–0.2)
PH UR STRIP: 7 [PH] (ref 5–8)
PLATELET # BLD AUTO: 301 10*3/MM3 (ref 140–450)
POTASSIUM SERPL-SCNC: 4.7 MMOL/L (ref 3.5–5.2)
PROT SERPL-MCNC: 7.1 G/DL (ref 6–8.5)
PROT UR QL STRIP: NEGATIVE
RBC # BLD AUTO: 5.48 10*6/MM3 (ref 4.14–5.8)
RBC #/AREA URNS HPF: NORMAL /HPF
SODIUM SERPL-SCNC: 143 MMOL/L (ref 136–145)
SP GR UR STRIP: 1.02 (ref 1–1.03)
TRIGL SERPL-MCNC: 80 MG/DL (ref 0–150)
TSH SERPL DL<=0.005 MIU/L-ACNC: 0.97 UIU/ML (ref 0.27–4.2)
UROBILINOGEN UR STRIP-MCNC: NORMAL MG/DL
VLDLC SERPL CALC-MCNC: 15 MG/DL (ref 5–40)
WBC # BLD AUTO: 11.28 10*3/MM3 (ref 3.4–10.8)
WBC #/AREA URNS HPF: NORMAL /HPF

## 2025-07-24 ENCOUNTER — OFFICE VISIT (OUTPATIENT)
Dept: INTERNAL MEDICINE | Facility: CLINIC | Age: 25
End: 2025-07-24
Payer: COMMERCIAL

## 2025-07-24 VITALS
HEIGHT: 68 IN | BODY MASS INDEX: 31.37 KG/M2 | WEIGHT: 207 LBS | OXYGEN SATURATION: 99 % | DIASTOLIC BLOOD PRESSURE: 84 MMHG | SYSTOLIC BLOOD PRESSURE: 130 MMHG | RESPIRATION RATE: 16 BRPM | HEART RATE: 66 BPM

## 2025-07-24 DIAGNOSIS — Z00.00 ANNUAL PHYSICAL EXAM: ICD-10-CM

## 2025-07-24 DIAGNOSIS — F32.A MILD DEPRESSION: ICD-10-CM

## 2025-07-24 DIAGNOSIS — F41.9 ANXIETY: Primary | ICD-10-CM

## 2025-07-24 RX ORDER — BUSPIRONE HYDROCHLORIDE 7.5 MG/1
7.5 TABLET ORAL 2 TIMES DAILY
Qty: 60 TABLET | Refills: 3 | Status: SHIPPED | OUTPATIENT
Start: 2025-07-24

## 2025-07-24 NOTE — PROGRESS NOTES
Subjective   Akua Rivas is a 24 y.o. male. Patient is here today for   Chief Complaint   Patient presents with    Annual Exam    Anxiety          Vitals:    07/24/25 1525   BP: 130/84   Pulse: 66   Resp: 16   SpO2: 99%     Body mass index is 31.47 kg/m².    The following portions of the patient's history were reviewed and updated as appropriate: allergies, current medications, past family history, past medical history, past social history, past surgical history and problem list.    Past Medical History:   Diagnosis Date    Asthma     Sleep apnea       No Known Allergies   Social History     Socioeconomic History    Marital status: Single   Tobacco Use    Smoking status: Never    Smokeless tobacco: Never   Vaping Use    Vaping status: Never Used   Substance and Sexual Activity    Alcohol use: Never    Drug use: Never    Sexual activity: Defer        Current Outpatient Medications:     dicyclomine (BENTYL) 10 MG capsule, Take 1 capsule by mouth 4 (Four) Times a Day Before Meals & at Bedtime As Needed (stomach cramping)., Disp: 120 capsule, Rfl: 3    busPIRone (BUSPAR) 7.5 MG tablet, Take 1 tablet by mouth 2 (Two) Times a Day., Disp: 60 tablet, Rfl: 3          History of Present Illness   Akua Rivas 24 y.o. male who presents for an Annual physical exam and to discuss anxiety . He has situational anxiety that has worsened over the last few months. He recently returned to night shift about and has not been sleeping well. He states that he is just not tired. He has not slept today and has to work at 830pm tonight.    Lab results discussed in detail with the patient.  Plan to update vaccines if needed today.    Health Habits:  Dental Exam. not up to date - .  Eye Exam. not up to date - .  Exercise: 0 times/week.  Current exercise activities include: none      Preventative counseling  - seat belt use for every car ride for patient and all occupants.   Encouraged sunscreen use to reduce risk of skin cancer for any days  with sun exposure over 20 minutes.   -Encouraged annual dental and vision exams as part of their overall health.   -Encouraged  exercise  combined with a well-balanced diet.   -Immunizations reviewed and updated in EMR.     The ASCVD Risk score (Prabha BROWN, et al., 2019) failed to calculate for the following reasons:    The 2019 ASCVD risk score is only valid for ages 40 to 79    Lab Results (most recent)       Orders Only on 07/15/2025   Component Date Value Ref Range Status    WBC 07/21/2025 11.28 (H)  3.40 - 10.80 10*3/mm3 Final    RBC 07/21/2025 5.48  4.14 - 5.80 10*6/mm3 Final    Hemoglobin 07/21/2025 14.8  13.0 - 17.7 g/dL Final    Hematocrit 07/21/2025 46.3  37.5 - 51.0 % Final    MCV 07/21/2025 84.5  79.0 - 97.0 fL Final    MCH 07/21/2025 27.0  26.6 - 33.0 pg Final    MCHC 07/21/2025 32.0  31.5 - 35.7 g/dL Final    RDW 07/21/2025 13.6  12.3 - 15.4 % Final    Platelets 07/21/2025 301  140 - 450 10*3/mm3 Final    Neutrophil Rel % 07/21/2025 44.2  42.7 - 76.0 % Final    Lymphocyte Rel % 07/21/2025 42.1  19.6 - 45.3 % Final    Monocyte Rel % 07/21/2025 9.0  5.0 - 12.0 % Final    Eosinophil Rel % 07/21/2025 4.2  0.3 - 6.2 % Final    Basophil Rel % 07/21/2025 0.3  0.0 - 1.5 % Final    Neutrophils Absolute 07/21/2025 4.99  1.70 - 7.00 10*3/mm3 Final    Lymphocytes Absolute 07/21/2025 4.75 (H)  0.70 - 3.10 10*3/mm3 Final    Monocytes Absolute 07/21/2025 1.02 (H)  0.10 - 0.90 10*3/mm3 Final    Eosinophils Absolute 07/21/2025 0.47 (H)  0.00 - 0.40 10*3/mm3 Final    Basophils Absolute 07/21/2025 0.03  0.00 - 0.20 10*3/mm3 Final    Immature Granulocyte Rel % 07/21/2025 0.2  0.0 - 0.5 % Final    Immature Grans Absolute 07/21/2025 0.02  0.00 - 0.05 10*3/mm3 Final    nRBC 07/21/2025 0.0  0.0 - 0.2 /100 WBC Final    Glucose 07/21/2025 91  65 - 99 mg/dL Final    BUN 07/21/2025 10.0  6.0 - 20.0 mg/dL Final    Creatinine 07/21/2025 0.98  0.76 - 1.27 mg/dL Final    EGFR Result 07/21/2025 110.4  >60.0 mL/min/1.73 Final     Comment: GFR Categories in Chronic Kidney Disease (CKD)  GFR Category          GFR (mL/min/1.73)    Interpretation  G1                    90 or greater        Normal or high  (1)  G2                    60-89                Mild decrease  (1)  G3a                   45-59                Mild to moderate  decrease  G3b                   30-44                Moderate to  severe decrease  G4                    15-29                Severe decrease  G5                    14 or less           Kidney failure  (1)In the absence of evidence of kidney disease, neither  GFR category G1 or G2 fulfill the criteria for CKD.  eGFR calculation 2021 CKD-EPI creatinine equation, which  does not include race as a factor      BUN/Creatinine Ratio 07/21/2025 10.2  7.0 - 25.0 Final    Sodium 07/21/2025 143  136 - 145 mmol/L Final    Potassium 07/21/2025 4.7  3.5 - 5.2 mmol/L Final    Chloride 07/21/2025 106  98 - 107 mmol/L Final    Total CO2 07/21/2025 26.2  22.0 - 29.0 mmol/L Final    Calcium 07/21/2025 9.9  8.6 - 10.5 mg/dL Final    Total Protein 07/21/2025 7.1  6.0 - 8.5 g/dL Final    Albumin 07/21/2025 4.6  3.5 - 5.2 g/dL Final    Globulin 07/21/2025 2.5  gm/dL Final    A/G Ratio 07/21/2025 1.8  g/dL Final    Total Bilirubin 07/21/2025 0.6  0.0 - 1.2 mg/dL Final    Alkaline Phosphatase 07/21/2025 87  39 - 117 U/L Final    AST (SGOT) 07/21/2025 20  1 - 40 U/L Final    ALT (SGPT) 07/21/2025 24  1 - 41 U/L Final    Total Cholesterol 07/21/2025 157  0 - 200 mg/dL Final    Comment: Cholesterol Reference Ranges  (U.S. Department of Health and Human Services ATP III  Classifications)  Desirable          <200 mg/dL  Borderline High    200-239 mg/dL  High Risk          >240 mg/dL  Triglyceride Reference Ranges  (U.S. Department of Health and Human Services ATP III  Classifications)  Normal           <150 mg/dL  Borderline High  150-199 mg/dL  High             200-499 mg/dL  Very High        >500 mg/dL  HDL Reference Ranges  (U.S. Department of  Health and Human Services ATP III  Classifications)  Low     <40 mg/dl (major risk factor for CHD)  High    >60 mg/dl ('negative' risk factor for CHD)  LDL Reference Ranges  (U.S. Department of Health and Human Services ATP III  Classifications)  Optimal          <100 mg/dL  Near Optimal     100-129 mg/dL  Borderline High  130-159 mg/dL  High             160-189 mg/dL  Very High        >189 mg/dL  LDL is calculated using the [facility] LDL-C calculation.      Triglycerides 07/21/2025 80  0 - 150 mg/dL Final    HDL Cholesterol 07/21/2025 40  40 - 60 mg/dL Final    VLDL Cholesterol Fermin 07/21/2025 15  5 - 40 mg/dL Final    LDL Chol Calc ([facility]) 07/21/2025 102 (H)  0 - 100 mg/dL Final    LDL/HDL RATIO 07/21/2025 2.53   Final    TSH 07/21/2025 0.972  0.270 - 4.200 uIU/mL Final    Specific Gravity, UA 07/21/2025 1.023  1.005 - 1.030 Final    pH, UA 07/21/2025 7.0  5.0 - 8.0 Final    Color, UA 07/21/2025 Yellow   Final    REFERENCE RANGE: Yellow, Straw    Appearance, UA 07/21/2025 Clear  Clear Final    Leukocytes, UA 07/21/2025 Negative  Negative Final    Protein 07/21/2025 Negative  Negative Final    Glucose, UA 07/21/2025 Negative  Negative Final    Ketones 07/21/2025 Negative  Negative Final    Blood, UA 07/21/2025 Negative  Negative Final    Bilirubin, UA 07/21/2025 Negative  Negative Final    Urobilinogen, UA 07/21/2025 Comment   Final    Comment: 1.0 E.U./dL  REFERENCE RANGE: 0.2 - 1.0 E.U./dL      Nitrite, UA 07/21/2025 Negative  Negative Final    WBC, UA 07/21/2025 0-2  /HPF Final    REFERENCE RANGE: None Seen, 0-2    RBC, UA 07/21/2025 0-2  /HPF Final    REFERENCE RANGE: None Seen, 0-2    Epithelial Cells (non renal) 07/21/2025 Comment  /HPF Final    Comment: None Seen  REFERENCE RANGE: None Seen, 0-2      Cast Type 07/21/2025 Comment   Final    HYALINE CASTS, UA            None Seen        /LPF       None Seen  N    Bacteria, UA 07/21/2025 Comment  None Seen /HPF Final    None Seen                 Health Maintenance    Topic Date Due    ANNUAL PHYSICAL  07/22/2025    COVID-19 Vaccine (1 - 2024-25 season) 01/24/2026 (Originally 9/1/2024)    INFLUENZA VACCINE  10/01/2025    TDAP/TD VACCINES (3 - Td or Tdap) 08/13/2033    HEPATITIS C SCREENING  Completed    Pneumococcal Vaccine 0-49  Aged Out    MENINGOCOCCAL B VACCINE  Aged Out           Review of Systems   Constitutional:  Negative for appetite change, chills, fatigue, fever and unexpected weight change.   Respiratory: Negative.     Cardiovascular:  Negative for palpitations.   Gastrointestinal: Negative.    Genitourinary:  Negative for dysuria.   Musculoskeletal: Negative.    Neurological: Negative.    Hematological: Negative.  Negative for adenopathy.   Psychiatric/Behavioral:  Positive for sleep disturbance. Negative for dysphoric mood, self-injury and suicidal ideas. The patient is nervous/anxious.      PHQ-9 Depression Screening  Little interest or pleasure in doing things? Several days   Feeling down, depressed, or hopeless? Several days   PHQ-2 Total Score 2   Trouble falling or staying asleep, or sleeping too much? Nearly every day   Feeling tired or having little energy? More than half the days   Poor appetite or overeating? Not at all   Feeling bad about yourself - or that you are a failure or have let yourself or your family down? More than half the days   Trouble concentrating on things, such as reading the newspaper or watching television? Not at all   Moving or speaking so slowly that other people could have noticed? Or the opposite - being so fidgety or restless that you have been moving around a lot more than usual? Not at all     Thoughts that you would be better off dead, or of hurting yourself in some way? Not at all   PHQ-9 Total Score 9   If you checked off any problems, how difficult have these problems made it for you to do your work, take care of things at home, or get along with other people? Somewhat difficult       Over the last two weeks, how often  have you been bothered by the following problems?  Feeling nervous, anxious or on edge: Nearly every day  Not being able to stop or control worrying: Nearly every day  Worrying too much about different things: Nearly every day  Trouble Relaxing: Nearly every day  Being so restless that it is hard to sit still: Several days  Becoming easily annoyed or irritable: Nearly every day  Feeling afraid as if something awful might happen: Nearly every day  JUAN PABLO 7 Total Score: 19  If you checked any problems, how difficult have these problems made it for you to do your work, take care of things at home, or get along with other people: Somewhat difficult      Objective   Physical Exam  Vitals and nursing note reviewed.   Constitutional:       General: He is not in acute distress.     Appearance: Normal appearance. He is well-developed and well-groomed.   HENT:      Head: Normocephalic.      Right Ear: Tympanic membrane and ear canal normal.      Left Ear: Tympanic membrane and ear canal normal.      Nose: Nose normal.      Mouth/Throat:      Pharynx: Oropharynx is clear.   Neck:      Thyroid: No thyromegaly.   Cardiovascular:      Rate and Rhythm: Normal rate and regular rhythm.      Heart sounds: Normal heart sounds.   Pulmonary:      Effort: Pulmonary effort is normal.      Breath sounds: Normal breath sounds.   Abdominal:      General: Bowel sounds are normal.      Palpations: Abdomen is soft.   Musculoskeletal:      Cervical back: Neck supple.   Skin:     General: Skin is warm and dry.   Neurological:      Mental Status: He is alert and oriented to person, place, and time.   Psychiatric:         Mood and Affect: Affect is flat.         ASSESSMENT       Problems Addressed this Visit    None  Visit Diagnoses         Anxiety    -  Primary    Relevant Medications    busPIRone (BUSPAR) 7.5 MG tablet      Mild depression        Relevant Medications    busPIRone (BUSPAR) 7.5 MG tablet      Annual physical exam               Diagnoses         Codes Comments      Anxiety    -  Primary ICD-10-CM: F41.9  ICD-9-CM: 300.00       Mild depression     ICD-10-CM: F32.A  ICD-9-CM: 311       Annual physical exam     ICD-10-CM: Z00.00  ICD-9-CM: V70.0             PLAN    Age and sex appropriate physical exam performed and documented. Updated past medical, family, social and surgical histories as well as allergies and care team list.   Anxiety and mild depression. He scored a 19 on the JUAN PABLO 7 and 9 on the PHQ9. Lack of sleep from switching back to night shift may be contributing. Discussed sleep hygiene. Offered trazodone as needed. He declined. He can try melatonin otc. Will start buspirone 7.5mg twice daily for anxiety . Discussed potential side effects and information on medication added to AVS . Recommend therapy - information on [facility] given. Discussed self care . Work note given for tonight per patient request  Wbc is elevated. It was elevated a year ago and resolved on recheck. Peripheral smear and esr and CRP were done. Pt is asymptomatic, will recheck at follow up       Return in 8 weeks for a follow up for anxiety and depression .will recheck cbc at that time   Patient was given instructions and counseling regarding his  condition for health maintenance advice.  Please see specific information pulled into the AVS if appropriate.

## 2025-07-24 NOTE — Clinical Note
Can you review his labs please. Every year when he comes in his wbc is elevated, did a repeat and peripheral smear, crp and sed rate last year. Any other recommendations are appreciated . He is following up in sept and will recheck. He is asymptomatic